# Patient Record
Sex: MALE | Race: WHITE | Employment: FULL TIME | URBAN - METROPOLITAN AREA
[De-identification: names, ages, dates, MRNs, and addresses within clinical notes are randomized per-mention and may not be internally consistent; named-entity substitution may affect disease eponyms.]

---

## 2018-04-24 ENCOUNTER — OFFICE VISIT (OUTPATIENT)
Dept: PULMONOLOGY | Age: 43
End: 2018-04-24
Payer: COMMERCIAL

## 2018-04-24 VITALS
WEIGHT: 295.6 LBS | DIASTOLIC BLOOD PRESSURE: 70 MMHG | OXYGEN SATURATION: 97 % | SYSTOLIC BLOOD PRESSURE: 110 MMHG | TEMPERATURE: 97.6 F | HEART RATE: 53 BPM | HEIGHT: 75 IN | BODY MASS INDEX: 36.75 KG/M2

## 2018-04-24 DIAGNOSIS — E66.9 CLASS 2 OBESITY WITHOUT SERIOUS COMORBIDITY IN ADULT, UNSPECIFIED BMI, UNSPECIFIED OBESITY TYPE: ICD-10-CM

## 2018-04-24 DIAGNOSIS — Z99.89 OSA ON CPAP: Primary | ICD-10-CM

## 2018-04-24 DIAGNOSIS — G47.33 OSA ON CPAP: Primary | ICD-10-CM

## 2018-04-24 PROCEDURE — G8427 DOCREV CUR MEDS BY ELIG CLIN: HCPCS | Performed by: PHYSICIAN ASSISTANT

## 2018-04-24 PROCEDURE — G8417 CALC BMI ABV UP PARAM F/U: HCPCS | Performed by: PHYSICIAN ASSISTANT

## 2018-04-24 PROCEDURE — 99213 OFFICE O/P EST LOW 20 MIN: CPT | Performed by: PHYSICIAN ASSISTANT

## 2018-04-24 PROCEDURE — 1036F TOBACCO NON-USER: CPT | Performed by: PHYSICIAN ASSISTANT

## 2018-04-24 ASSESSMENT — ENCOUNTER SYMPTOMS
SINUS PAIN: 0
ABDOMINAL PAIN: 0
SORE THROAT: 0
BACK PAIN: 0
STRIDOR: 0
RHINORRHEA: 0
SHORTNESS OF BREATH: 0
SINUS PRESSURE: 0
CHEST TIGHTNESS: 0
VOICE CHANGE: 0
TROUBLE SWALLOWING: 0
WHEEZING: 0
COLOR CHANGE: 0
COUGH: 0

## 2018-04-27 ENCOUNTER — TELEPHONE (OUTPATIENT)
Dept: PULMONOLOGY | Age: 43
End: 2018-04-27

## 2018-05-14 ENCOUNTER — TELEPHONE (OUTPATIENT)
Dept: PULMONOLOGY | Age: 43
End: 2018-05-14

## 2018-10-23 ENCOUNTER — OFFICE VISIT (OUTPATIENT)
Dept: PULMONOLOGY | Age: 43
End: 2018-10-23
Payer: COMMERCIAL

## 2018-10-23 VITALS
HEIGHT: 75 IN | TEMPERATURE: 98.4 F | HEART RATE: 80 BPM | BODY MASS INDEX: 34.82 KG/M2 | SYSTOLIC BLOOD PRESSURE: 124 MMHG | WEIGHT: 280 LBS | DIASTOLIC BLOOD PRESSURE: 76 MMHG | OXYGEN SATURATION: 97 % | RESPIRATION RATE: 16 BRPM

## 2018-10-23 DIAGNOSIS — G47.33 OSA ON CPAP: Primary | ICD-10-CM

## 2018-10-23 DIAGNOSIS — E66.9 CLASS 2 OBESITY WITHOUT SERIOUS COMORBIDITY IN ADULT, UNSPECIFIED BMI, UNSPECIFIED OBESITY TYPE: ICD-10-CM

## 2018-10-23 DIAGNOSIS — Z99.89 OSA ON CPAP: Primary | ICD-10-CM

## 2018-10-23 PROBLEM — G89.29 CHRONIC BILATERAL LOW BACK PAIN WITHOUT SCIATICA: Status: ACTIVE | Noted: 2018-07-11

## 2018-10-23 PROBLEM — M54.50 CHRONIC BILATERAL LOW BACK PAIN WITHOUT SCIATICA: Status: ACTIVE | Noted: 2018-07-11

## 2018-10-23 PROBLEM — H04.123 CHRONICALLY DRY EYES, BILATERAL: Status: ACTIVE | Noted: 2018-07-11

## 2018-10-23 PROBLEM — E55.9 VITAMIN D DEFICIENCY: Status: ACTIVE | Noted: 2018-07-12

## 2018-10-23 PROBLEM — M25.521 BILATERAL ELBOW JOINT PAIN: Status: ACTIVE | Noted: 2018-07-11

## 2018-10-23 PROBLEM — M79.641 BILATERAL HAND PAIN: Status: ACTIVE | Noted: 2018-07-11

## 2018-10-23 PROBLEM — M79.671 PAIN IN BOTH FEET: Status: ACTIVE | Noted: 2018-07-11

## 2018-10-23 PROBLEM — M25.60 JOINT STIFFNESS OF MULTIPLE SITES: Status: ACTIVE | Noted: 2018-07-11

## 2018-10-23 PROBLEM — M25.522 BILATERAL ELBOW JOINT PAIN: Status: ACTIVE | Noted: 2018-07-11

## 2018-10-23 PROBLEM — M79.672 PAIN IN BOTH FEET: Status: ACTIVE | Noted: 2018-07-11

## 2018-10-23 PROBLEM — M79.642 BILATERAL HAND PAIN: Status: ACTIVE | Noted: 2018-07-11

## 2018-10-23 PROCEDURE — 99213 OFFICE O/P EST LOW 20 MIN: CPT | Performed by: PHYSICIAN ASSISTANT

## 2018-10-23 PROCEDURE — 1036F TOBACCO NON-USER: CPT | Performed by: PHYSICIAN ASSISTANT

## 2018-10-23 PROCEDURE — G8417 CALC BMI ABV UP PARAM F/U: HCPCS | Performed by: PHYSICIAN ASSISTANT

## 2018-10-23 PROCEDURE — G8484 FLU IMMUNIZE NO ADMIN: HCPCS | Performed by: PHYSICIAN ASSISTANT

## 2018-10-23 PROCEDURE — G8427 DOCREV CUR MEDS BY ELIG CLIN: HCPCS | Performed by: PHYSICIAN ASSISTANT

## 2018-10-23 RX ORDER — ALLOPURINOL 300 MG/1
300 TABLET ORAL
COMMUNITY
Start: 2018-10-16

## 2018-10-23 RX ORDER — ERGOCALCIFEROL 1.25 MG/1
CAPSULE ORAL
COMMUNITY
Start: 2018-10-17 | End: 2019-03-11 | Stop reason: ALTCHOICE

## 2018-10-23 NOTE — PROGRESS NOTES
Medication Sig Dispense Refill    allopurinol (ZYLOPRIM) 300 MG tablet Take 300 mg by mouth      vitamin D (ERGOCALCIFEROL) 47100 units CAPS capsule       CPAP Machine MISC by Does not apply route Please supply patient with new CPAP machine patient is on auto CPAP with a minimum of 7 and a maximum of 20 cm nightly 1 each 0    CPAP Machine MISC by Does not apply route      testosterone (ANDROGEL) 25 MG/2.5GM (1%) GEL 1 % gel Apply 5 g topically daily .  colchicine (COLCRYS) 0.6 MG tablet Take 0.6 mg by mouth daily       No current facility-administered medications for this visit. Review of Systems   Constitutional: Negative for activity change, appetite change, fever and unexpected weight change. HENT: Negative for postnasal drip, rhinorrhea, sinus pain, sinus pressure, sneezing, sore throat, tinnitus, trouble swallowing and voice change. Eyes: Negative for visual disturbance. Respiratory: Negative for cough, chest tightness, shortness of breath, wheezing and stridor. Cardiovascular: Negative for chest pain and leg swelling. Gastrointestinal: Negative for abdominal pain. Musculoskeletal: Negative for arthralgias, back pain and myalgias. Skin: Negative for color change, rash and wound. Allergic/Immunologic: Negative for immunocompromised state. Neurological: Negative for dizziness and headaches. Hematological: Negative for adenopathy. Psychiatric/Behavioral: Negative for confusion. Objective    Vitals:    10/23/18 1534   BP: 124/76   Pulse: 80   Resp: 16   Temp: 98.4 °F (36.9 °C)   TempSrc: Tympanic   SpO2: 97%   Weight: 280 lb (127 kg)   Height: 6' 3\" (1.905 m)       Physical Exam   Constitutional: He is oriented to person, place, and time. He appears well-developed and well-nourished. No distress. HENT:   Head: Normocephalic and atraumatic. Right Ear: External ear normal. No tenderness. No middle ear effusion. Left Ear: External ear normal. No tenderness.   No He avoid supine sleep, sleep on her sides. Avoid  sleep deprivation.  Explained sleep hygiene.  Advice to avoid Alcohol and sedative    Reviewed with thepatient: current clinical status, medications, activities and diet. Side effects, adverse effects of the medication prescribed today, as well as treatment plan and result expectations have been discussed with thepatient who expresses understanding and desires to proceed. Close follow up to evaluate treatment results and for coordination of care. I have reviewed the patient's medical history in detail and updated thecomputerized patient record. Return in about 3 months (around 1/23/2019) for re-evaluation follow up with santos .     Stephanie Godfrey PA-C

## 2018-10-24 ASSESSMENT — ENCOUNTER SYMPTOMS
SINUS PAIN: 0
SORE THROAT: 0
RHINORRHEA: 0
TROUBLE SWALLOWING: 0
WHEEZING: 0
ABDOMINAL PAIN: 0
SINUS PRESSURE: 0
CHEST TIGHTNESS: 0
STRIDOR: 0
COUGH: 0
COLOR CHANGE: 0
SHORTNESS OF BREATH: 0
BACK PAIN: 0
VOICE CHANGE: 0

## 2019-03-11 ENCOUNTER — OFFICE VISIT (OUTPATIENT)
Dept: PULMONOLOGY | Age: 44
End: 2019-03-11
Payer: COMMERCIAL

## 2019-03-11 VITALS
DIASTOLIC BLOOD PRESSURE: 74 MMHG | SYSTOLIC BLOOD PRESSURE: 126 MMHG | HEART RATE: 78 BPM | OXYGEN SATURATION: 97 % | TEMPERATURE: 98.4 F | HEIGHT: 75 IN | WEIGHT: 288 LBS | BODY MASS INDEX: 35.81 KG/M2 | RESPIRATION RATE: 16 BRPM

## 2019-03-11 DIAGNOSIS — E66.9 OBESITY (BMI 30-39.9): ICD-10-CM

## 2019-03-11 DIAGNOSIS — G47.33 OSA (OBSTRUCTIVE SLEEP APNEA): Primary | ICD-10-CM

## 2019-03-11 DIAGNOSIS — E66.01 MORBID OBESITY (HCC): ICD-10-CM

## 2019-03-11 PROCEDURE — 99214 OFFICE O/P EST MOD 30 MIN: CPT | Performed by: INTERNAL MEDICINE

## 2019-03-11 PROCEDURE — G8427 DOCREV CUR MEDS BY ELIG CLIN: HCPCS | Performed by: INTERNAL MEDICINE

## 2019-03-11 PROCEDURE — G8417 CALC BMI ABV UP PARAM F/U: HCPCS | Performed by: INTERNAL MEDICINE

## 2019-03-11 PROCEDURE — G8484 FLU IMMUNIZE NO ADMIN: HCPCS | Performed by: INTERNAL MEDICINE

## 2019-03-11 PROCEDURE — 1036F TOBACCO NON-USER: CPT | Performed by: INTERNAL MEDICINE

## 2019-03-11 ASSESSMENT — ENCOUNTER SYMPTOMS
NAUSEA: 0
COUGH: 0
EYE ITCHING: 0
CHEST TIGHTNESS: 0
ABDOMINAL PAIN: 0
SORE THROAT: 0
VOMITING: 0
RHINORRHEA: 0
SHORTNESS OF BREATH: 0
VOICE CHANGE: 0
DIARRHEA: 0
WHEEZING: 0

## 2019-09-09 ENCOUNTER — OFFICE VISIT (OUTPATIENT)
Dept: PULMONOLOGY | Age: 44
End: 2019-09-09
Payer: COMMERCIAL

## 2019-09-09 VITALS
HEIGHT: 75 IN | SYSTOLIC BLOOD PRESSURE: 124 MMHG | HEART RATE: 81 BPM | BODY MASS INDEX: 36.56 KG/M2 | DIASTOLIC BLOOD PRESSURE: 62 MMHG | WEIGHT: 294 LBS | TEMPERATURE: 98.6 F | RESPIRATION RATE: 16 BRPM | OXYGEN SATURATION: 98 %

## 2019-09-09 DIAGNOSIS — G47.33 OSA (OBSTRUCTIVE SLEEP APNEA): Primary | ICD-10-CM

## 2019-09-09 DIAGNOSIS — E66.9 OBESITY (BMI 30-39.9): ICD-10-CM

## 2019-09-09 PROCEDURE — 99214 OFFICE O/P EST MOD 30 MIN: CPT | Performed by: INTERNAL MEDICINE

## 2019-09-09 PROCEDURE — G8427 DOCREV CUR MEDS BY ELIG CLIN: HCPCS | Performed by: INTERNAL MEDICINE

## 2019-09-09 PROCEDURE — G8417 CALC BMI ABV UP PARAM F/U: HCPCS | Performed by: INTERNAL MEDICINE

## 2019-09-09 PROCEDURE — 1036F TOBACCO NON-USER: CPT | Performed by: INTERNAL MEDICINE

## 2019-09-09 RX ORDER — SILDENAFIL 50 MG/1
TABLET, FILM COATED ORAL
COMMUNITY
Start: 2019-08-26

## 2019-09-09 RX ORDER — KETOCONAZOLE 20 MG/ML
SHAMPOO TOPICAL
COMMUNITY
Start: 2019-08-06

## 2019-09-09 RX ORDER — TESTOSTERONE 16.2 MG/G
GEL TRANSDERMAL
COMMUNITY
Start: 2019-08-15

## 2019-09-09 RX ORDER — FLUOCINONIDE TOPICAL SOLUTION USP, 0.05% 0.5 MG/ML
SOLUTION TOPICAL
COMMUNITY
Start: 2019-08-06

## 2019-09-09 ASSESSMENT — ENCOUNTER SYMPTOMS
SHORTNESS OF BREATH: 0
RHINORRHEA: 0
SORE THROAT: 0
VOMITING: 0
DIARRHEA: 0
COUGH: 0
WHEEZING: 0
CHEST TIGHTNESS: 0
VOICE CHANGE: 0
ABDOMINAL PAIN: 0
NAUSEA: 0
EYE ITCHING: 0

## 2019-09-09 NOTE — PROGRESS NOTES
to light. Conjunctivae and EOM are normal.   Neck: No JVD present. No tracheal deviation present. No thyromegaly present. Cardiovascular: Normal rate and regular rhythm. Exam reveals no gallop and no friction rub. No murmur heard. Pulmonary/Chest: Effort normal and breath sounds normal. No respiratory distress. He has no wheezes. He has no rales. He exhibits no tenderness. Abdominal: He exhibits no distension. Musculoskeletal: Normal range of motion. Lymphadenopathy:     He has no cervical adenopathy. Neurological: He is alert and oriented to person, place, and time. No cranial nerve deficit. Skin: Skin is warm and dry. No rash noted. Psychiatric: He has a normal mood and affect. His behavior is normal.       Current Outpatient Medications   Medication Sig Dispense Refill    Testosterone (ANDROGEL) 20.25 MG/ACT (1.62%) GEL gel       ketoconazole (NIZORAL) 2 % shampoo       fluocinonide (LIDEX) 0.05 % external solution       sildenafil (VIAGRA) 50 MG tablet       Flaxseed Oil OIL Take by mouth      allopurinol (ZYLOPRIM) 300 MG tablet Take 300 mg by mouth      CPAP Machine MISC by Does not apply route Please supply patient with new CPAP machine patient is on auto CPAP with a minimum of 7 and a maximum of 20 cm nightly 1 each 0    CPAP Machine MISC by Does not apply route      colchicine (COLCRYS) 0.6 MG tablet Take 0.6 mg by mouth daily       No current facility-administered medications for this visit. Assessment/Plan:     1. PARVIZ (obstructive sleep apnea)  Patient is using Auto CPAP with  7-20   centimeters of H2O with heated humidity. He  is using CPAP for about   6-7 hours every night. He  is using CPAP with arlen view mask   Mask. He said  sleep is restful with the CPAP use. He  is compliant with CPAP therapy and benefiting with CPAP use. No snoring with CPAP use. Continue CPAP as before. Counseling: CPAP/BiPAP uses, patient advised to use CPAP at least 5-6 hours every night.

## 2020-03-09 ENCOUNTER — OFFICE VISIT (OUTPATIENT)
Dept: PULMONOLOGY | Age: 45
End: 2020-03-09
Payer: COMMERCIAL

## 2020-03-09 VITALS
HEART RATE: 78 BPM | DIASTOLIC BLOOD PRESSURE: 68 MMHG | RESPIRATION RATE: 15 BRPM | WEIGHT: 299.6 LBS | OXYGEN SATURATION: 98 % | HEIGHT: 75 IN | SYSTOLIC BLOOD PRESSURE: 108 MMHG | BODY MASS INDEX: 37.25 KG/M2 | TEMPERATURE: 96.6 F

## 2020-03-09 PROCEDURE — G8417 CALC BMI ABV UP PARAM F/U: HCPCS | Performed by: INTERNAL MEDICINE

## 2020-03-09 PROCEDURE — 99214 OFFICE O/P EST MOD 30 MIN: CPT | Performed by: INTERNAL MEDICINE

## 2020-03-09 PROCEDURE — G8427 DOCREV CUR MEDS BY ELIG CLIN: HCPCS | Performed by: INTERNAL MEDICINE

## 2020-03-09 PROCEDURE — 1036F TOBACCO NON-USER: CPT | Performed by: INTERNAL MEDICINE

## 2020-03-09 PROCEDURE — G8484 FLU IMMUNIZE NO ADMIN: HCPCS | Performed by: INTERNAL MEDICINE

## 2020-03-09 ASSESSMENT — ENCOUNTER SYMPTOMS
RHINORRHEA: 0
WHEEZING: 0
EYE ITCHING: 0
ABDOMINAL PAIN: 0
SORE THROAT: 0
COUGH: 0
CHEST TIGHTNESS: 0
VOICE CHANGE: 0
DIARRHEA: 0
VOMITING: 0
SHORTNESS OF BREATH: 0
NAUSEA: 0

## 2020-03-09 NOTE — PROGRESS NOTES
the CPAP use. He is compliant with CPAP therapy and benefiting with CPAP use. No snoring with CPAP use. coninue CPAP as before     Counseling: CPAP/BiPAP uses, patient advised to use CPAP at least 5-6 hours every night. Driving: patient is advised for extreme caution when driving or operating machinery if there is a feeling of drowsiness, especially while driving it is preferable to stop driving and take a brief nap. Sleep hygiene:Avoid supine sleep, sleep on  sides. Avoid  sleep deprivation. Explained sleep hygiene. Advice to avoid Alcohol and sedative    Time spend over 25 min. Face to face. with greater than 50 % time with counseling regarding CPAP therapy. 2. Obesity (BMI 30-39. 9)  Patient patient is advised try to lose weight. obesity related risk explained to the patient ,  Current weight:  299 lb 9.6 oz (135.9 kg) Lbs. BMI:  Body mass index is 37.45 kg/m². Suggested weight control approaches, including dietary changes , exercise, behavioral modification. Return in about 11 months (around 2/9/2021) for carlos.       Vivi Verma MD

## 2021-02-08 ENCOUNTER — OFFICE VISIT (OUTPATIENT)
Dept: PULMONOLOGY | Age: 46
End: 2021-02-08
Payer: COMMERCIAL

## 2021-02-08 VITALS
OXYGEN SATURATION: 97 % | TEMPERATURE: 97.5 F | HEART RATE: 75 BPM | WEIGHT: 286 LBS | DIASTOLIC BLOOD PRESSURE: 70 MMHG | SYSTOLIC BLOOD PRESSURE: 122 MMHG | HEIGHT: 75 IN | RESPIRATION RATE: 16 BRPM | BODY MASS INDEX: 35.56 KG/M2

## 2021-02-08 DIAGNOSIS — G47.33 OSA (OBSTRUCTIVE SLEEP APNEA): Primary | ICD-10-CM

## 2021-02-08 DIAGNOSIS — E66.9 OBESITY (BMI 30-39.9): ICD-10-CM

## 2021-02-08 PROCEDURE — G8417 CALC BMI ABV UP PARAM F/U: HCPCS | Performed by: INTERNAL MEDICINE

## 2021-02-08 PROCEDURE — 99214 OFFICE O/P EST MOD 30 MIN: CPT | Performed by: INTERNAL MEDICINE

## 2021-02-08 PROCEDURE — 1036F TOBACCO NON-USER: CPT | Performed by: INTERNAL MEDICINE

## 2021-02-08 PROCEDURE — G8484 FLU IMMUNIZE NO ADMIN: HCPCS | Performed by: INTERNAL MEDICINE

## 2021-02-08 PROCEDURE — G8427 DOCREV CUR MEDS BY ELIG CLIN: HCPCS | Performed by: INTERNAL MEDICINE

## 2021-02-08 RX ORDER — LIFITEGRAST 50 MG/ML
SOLUTION/ DROPS OPHTHALMIC
COMMUNITY
Start: 2021-01-17

## 2021-02-08 ASSESSMENT — ENCOUNTER SYMPTOMS
NAUSEA: 0
VOMITING: 0
COUGH: 0
VOICE CHANGE: 0
ABDOMINAL PAIN: 0
CHEST TIGHTNESS: 0
SORE THROAT: 0
DIARRHEA: 0
EYE ITCHING: 0
RHINORRHEA: 0
WHEEZING: 0
SHORTNESS OF BREATH: 0

## 2021-02-08 NOTE — PROGRESS NOTES
Subjective:     Song Wilcox is a 39 y.o. male who complains today of:     Chief Complaint   Patient presents with    Follow-up     11 month f/u for PARVIZ. HPI  He is using Auto CPAP with 7-20  centimeters of H2O with heated humidity. He is using CPAP for about  6-7 hours every night. He is using CPAP with   Danika view Mask. He said  sleep is restful with the CPAP use. He is compliant with CPAP therapy and benefiting with CPAP use. No snoring with CPAP use. No complaint of daytime sleepiness or tiredness with CPAP use. He denies taking naps. No sleepiness with driving. He denies difficulty falling asleep or staying asleep.     Allergies:  Amoxicillin, Cefaclor, Penicillins, Sulfa antibiotics, and Tetracycline  Past Medical History:   Diagnosis Date    Chronic headaches     PARVIZ (obstructive sleep apnea)      Past Surgical History:   Procedure Laterality Date    NOSE SURGERY  1998    polyps removed    RHINOPLASTY  1992     Family History   Problem Relation Age of Onset    Cancer Mother     Breast Cancer Mother     Hypertension Father      Social History     Socioeconomic History    Marital status:      Spouse name: Not on file    Number of children: Not on file    Years of education: Not on file    Highest education level: Not on file   Occupational History    Not on file   Social Needs    Financial resource strain: Not on file    Food insecurity     Worry: Not on file     Inability: Not on file    Transportation needs     Medical: Not on file     Non-medical: Not on file   Tobacco Use    Smoking status: Never Smoker    Smokeless tobacco: Never Used   Substance and Sexual Activity    Alcohol use: Yes     Comment: occasionally    Drug use: No    Sexual activity: Not on file   Lifestyle    Physical activity     Days per week: Not on file     Minutes per session: Not on file    Stress: Not on file   Relationships    Social connections     Talks on phone: Not on file Gets together: Not on file     Attends Zoroastrianism service: Not on file     Active member of club or organization: Not on file     Attends meetings of clubs or organizations: Not on file     Relationship status: Not on file    Intimate partner violence     Fear of current or ex partner: Not on file     Emotionally abused: Not on file     Physically abused: Not on file     Forced sexual activity: Not on file   Other Topics Concern    Not on file   Social History Narrative    Not on file         Review of Systems   Constitutional: Negative for chills, diaphoresis, fatigue and fever. HENT: Negative for congestion, mouth sores, nosebleeds, postnasal drip, rhinorrhea, sneezing, sore throat and voice change. Eyes: Negative for itching and visual disturbance. Respiratory: Negative for cough, chest tightness, shortness of breath and wheezing. Cardiovascular: Negative. Negative for chest pain, palpitations and leg swelling. Gastrointestinal: Negative for abdominal pain, diarrhea, nausea and vomiting. Genitourinary: Negative for difficulty urinating and hematuria. Musculoskeletal: Negative for arthralgias, joint swelling and myalgias. Skin: Negative for rash. Allergic/Immunologic: Negative for environmental allergies. Neurological: Negative for dizziness, tremors, weakness and headaches. Psychiatric/Behavioral: Positive for sleep disturbance. Negative for behavioral problems. :     Vitals:    02/08/21 0838   BP: 122/70   Pulse: 75   Resp: 16   Temp: 97.5 °F (36.4 °C)   TempSrc: Temporal   SpO2: 97%   Weight: 286 lb (129.7 kg)   Height: 6' 3\" (1.905 m)     Wt Readings from Last 3 Encounters:   02/08/21 286 lb (129.7 kg)   03/09/20 299 lb 9.6 oz (135.9 kg)   09/09/19 294 lb (133.4 kg)         Physical Exam  Constitutional:       Appearance: He is well-developed. He is obese. HENT:      Head: Normocephalic and atraumatic.       Nose: Nose normal.   Eyes: Conjunctiva/sclera: Conjunctivae normal.      Pupils: Pupils are equal, round, and reactive to light. Neck:      Thyroid: No thyromegaly. Vascular: No JVD. Trachea: No tracheal deviation. Cardiovascular:      Rate and Rhythm: Normal rate and regular rhythm. Heart sounds: No murmur. No friction rub. No gallop. Pulmonary:      Effort: Pulmonary effort is normal. No respiratory distress. Breath sounds: Normal breath sounds. No wheezing or rales. Chest:      Chest wall: No tenderness. Abdominal:      General: There is no distension. Musculoskeletal: Normal range of motion. Lymphadenopathy:      Cervical: No cervical adenopathy. Skin:     General: Skin is warm and dry. Findings: No rash. Neurological:      Mental Status: He is alert and oriented to person, place, and time. Cranial Nerves: No cranial nerve deficit. Psychiatric:         Behavior: Behavior normal.         Current Outpatient Medications   Medication Sig Dispense Refill    XIIDRA 5 % SOLN       Respiratory Therapy Supplies BEVERLY New CPAP mask and supplies 1 Device 0    Testosterone (ANDROGEL) 20.25 MG/ACT (1.62%) GEL gel       ketoconazole (NIZORAL) 2 % shampoo       fluocinonide (LIDEX) 0.05 % external solution       sildenafil (VIAGRA) 50 MG tablet       Flaxseed Oil OIL Take by mouth      allopurinol (ZYLOPRIM) 300 MG tablet Take 300 mg by mouth      CPAP Machine MISC by Does not apply route Please supply patient with new CPAP machine patient is on auto CPAP with a minimum of 7 and a maximum of 20 cm nightly 1 each 0    CPAP Machine MISC by Does not apply route      colchicine (COLCRYS) 0.6 MG tablet Take 0.6 mg by mouth as needed        No current facility-administered medications for this visit.           Assessment/Plan:     1. PARVIZ (obstructive sleep apnea) He is using Auto CPAP with 7-20  centimeters of H2O with heated humidity. He is using CPAP for about  6-7 hours every night. He is using CPAP with   Danika view Mask. He said  sleep is restful with the CPAP use. He is compliant with CPAP therapy and benefiting with CPAP use. No snoring with CPAP use. Continue CPAP therapy as before. No need for CPAP supply    - Respiratory Therapy Supplies BEVERLY; New CPAP mask and supplies  Dispense: 1 Device; Refill: 0    Counseling: CPAP/BiPAP uses, He advised to use CPAP at least 5-6 hours every night. Driving: He is advised for extreme caution when driving or operating machinery if there is a feeling of drowsiness, especially while driving it is preferable to stop driving and take a brief nap. Sleep hygiene:Avoid supine sleep, sleep on  sides. Avoid  sleep deprivation. Explained sleep hygiene. Advice to avoid Alcohol and sedative    Time spend over 30 min. Face to face. with greater than 50 % time with counseling regarding CPAP therapy. 2. Obesity (BMI 30-39. 9)  He is advised try to lose weight. obesity related risk explained to the patient ,  Current weight:  286 lb (129.7 kg) Lbs. BMI:  Body mass index is 35.75 kg/m². Suggested weight control approaches, including dietary changes , exercise, behavioral modification. Return in about 1 year (around 2/8/2022) for carlos.       Petros Garcia MD

## 2022-02-09 ENCOUNTER — OFFICE VISIT (OUTPATIENT)
Dept: PULMONOLOGY | Age: 47
End: 2022-02-09
Payer: COMMERCIAL

## 2022-02-09 VITALS
SYSTOLIC BLOOD PRESSURE: 120 MMHG | WEIGHT: 224 LBS | BODY MASS INDEX: 27.85 KG/M2 | HEIGHT: 75 IN | OXYGEN SATURATION: 99 % | HEART RATE: 72 BPM | DIASTOLIC BLOOD PRESSURE: 56 MMHG | TEMPERATURE: 98.2 F

## 2022-02-09 DIAGNOSIS — G47.33 OSA (OBSTRUCTIVE SLEEP APNEA): Primary | ICD-10-CM

## 2022-02-09 PROCEDURE — G8484 FLU IMMUNIZE NO ADMIN: HCPCS | Performed by: INTERNAL MEDICINE

## 2022-02-09 PROCEDURE — 99214 OFFICE O/P EST MOD 30 MIN: CPT | Performed by: INTERNAL MEDICINE

## 2022-02-09 PROCEDURE — G8419 CALC BMI OUT NRM PARAM NOF/U: HCPCS | Performed by: INTERNAL MEDICINE

## 2022-02-09 PROCEDURE — 1036F TOBACCO NON-USER: CPT | Performed by: INTERNAL MEDICINE

## 2022-02-09 PROCEDURE — G8427 DOCREV CUR MEDS BY ELIG CLIN: HCPCS | Performed by: INTERNAL MEDICINE

## 2022-02-09 ASSESSMENT — ENCOUNTER SYMPTOMS
COUGH: 0
SORE THROAT: 0
RHINORRHEA: 0
WHEEZING: 0
ABDOMINAL PAIN: 0
NAUSEA: 0
VOICE CHANGE: 0
EYE ITCHING: 0
DIARRHEA: 0
CHEST TIGHTNESS: 0
SHORTNESS OF BREATH: 0
VOMITING: 0

## 2022-02-09 NOTE — PROGRESS NOTES
Subjective:     Lowell Dominguez is a 42 y.o. male who complains today of:     Chief Complaint   Patient presents with    Sleep Apnea     1 year f/u       HPI  He is using Auto CPAP with 7-20  centimeters of H2O with heated humidity. He is using CPAP for about  5 hours every night. He is feeling bloated in morning. He is using CPAP with   Danika view Mask. He  Lost 75  Lbs in last 2 years. He want to see if he still has sleep apnea want to have sleep study done . He would like  To have home sleep study. No snoring with CPAP use. No complaint of daytime sleepiness or tiredness with CPAP use. He denies taking naps. No sleepiness with driving. He denies difficulty falling asleep or staying asleep.     Allergies:  Amoxicillin, Cefaclor, Penicillins, Sulfa antibiotics, and Tetracycline  Past Medical History:   Diagnosis Date    Chronic headaches     PARVIZ (obstructive sleep apnea)      Past Surgical History:   Procedure Laterality Date    NOSE SURGERY  1998    polyps removed    RHINOPLASTY  1992     Family History   Problem Relation Age of Onset    Cancer Mother     Breast Cancer Mother     Hypertension Father      Social History     Socioeconomic History    Marital status:      Spouse name: Not on file    Number of children: Not on file    Years of education: Not on file    Highest education level: Not on file   Occupational History    Not on file   Tobacco Use    Smoking status: Never Smoker    Smokeless tobacco: Never Used   Substance and Sexual Activity    Alcohol use: Yes     Comment: occasionally    Drug use: No    Sexual activity: Not on file   Other Topics Concern    Not on file   Social History Narrative    Not on file     Social Determinants of Health     Financial Resource Strain:     Difficulty of Paying Living Expenses: Not on file   Food Insecurity:     Worried About Running Out of Food in the Last Year: Not on file    [place] of Food in the Last Year: Not on file Transportation Needs:     Lack of Transportation (Medical): Not on file    Lack of Transportation (Non-Medical): Not on file   Physical Activity:     Days of Exercise per Week: Not on file    Minutes of Exercise per Session: Not on file   Stress:     Feeling of Stress : Not on file   Social Connections:     Frequency of Communication with Friends and Family: Not on file    Frequency of Social Gatherings with Friends and Family: Not on file    Attends [employer] Services: Not on file    Active Member of [address] [place] [address] or Organizations: Not on file    Attends Club or Organization Meetings: Not on file    Marital Status: Not on file   Intimate Partner Violence:     Fear of Current or Ex-Partner: Not on file    Emotionally Abused: Not on file    Physically Abused: Not on file    Sexually Abused: Not on file   Housing Stability:     Unable to Pay for Housing in the Last Year: Not on file    Number of [place] in the Last Year: Not on file    Unstable Housing in the Last Year: Not on file         Review of Systems   Constitutional: Negative for chills, diaphoresis, fatigue and fever. HENT: Negative for congestion, mouth sores, nosebleeds, postnasal drip, rhinorrhea, sneezing, sore throat and voice change. Eyes: Negative for itching and visual disturbance. Respiratory: Negative for cough, chest tightness, shortness of breath and wheezing. Cardiovascular: Negative. Negative for chest pain, palpitations and leg swelling. Gastrointestinal: Negative for abdominal pain, diarrhea, nausea and vomiting. Genitourinary: Negative for difficulty urinating and hematuria. Musculoskeletal: Negative for arthralgias, joint swelling and myalgias. Skin: Negative for rash. Allergic/Immunologic: Negative for environmental allergies. Neurological: Negative for dizziness, tremors, weakness and headaches. Psychiatric/Behavioral: Positive for sleep disturbance. Negative for behavioral problems.          Marlyn Canales Vitals:    02/09/22 0856   BP: (!) 120/56   Pulse: 72   Temp: 98.2 °F (36.8 °C)   SpO2: 99%   Weight: 224 lb (101.6 kg)   Height: 6' 3\" (1.905 m)     Wt Readings from Last 3 Encounters:   02/09/22 224 lb (101.6 kg)   02/08/21 286 lb (129.7 kg)   03/09/20 299 lb 9.6 oz (135.9 kg)         Physical Exam  Constitutional:       Appearance: He is well-developed. HENT:      Head: Normocephalic and atraumatic. Nose: Nose normal.   Eyes:      Conjunctiva/sclera: Conjunctivae normal.      Pupils: Pupils are equal, round, and reactive to light. Neck:      Thyroid: No thyromegaly. Vascular: No JVD. Trachea: No tracheal deviation. Cardiovascular:      Rate and Rhythm: Normal rate and regular rhythm. Heart sounds: No murmur heard. No friction rub. No gallop. Pulmonary:      Effort: Pulmonary effort is normal. No respiratory distress. Breath sounds: Normal breath sounds. No wheezing or rales. Chest:      Chest wall: No tenderness. Abdominal:      General: There is no distension. Musculoskeletal:         General: Normal range of motion. Lymphadenopathy:      Cervical: No cervical adenopathy. Skin:     General: Skin is warm and dry. Findings: No rash. Neurological:      Mental Status: He is alert and oriented to person, place, and time. Cranial Nerves: No cranial nerve deficit.    Psychiatric:         Behavior: Behavior normal.         Current Outpatient Medications   Medication Sig Dispense Refill    XIIDRA 5 % SOLN       Respiratory Therapy Supplies BEVERLY New CPAP mask and supplies 1 Device 0    Testosterone (ANDROGEL) 20.25 MG/ACT (1.62%) GEL gel       ketoconazole (NIZORAL) 2 % shampoo       fluocinonide (LIDEX) 0.05 % external solution       sildenafil (VIAGRA) 50 MG tablet       Flaxseed Oil OIL Take by mouth      allopurinol (ZYLOPRIM) 300 MG tablet Take 300 mg by mouth      CPAP Machine MISC by Does not apply route Please supply patient with new CPAP machine patient is on auto CPAP with a minimum of 7 and a maximum of 20 cm nightly 1 each 0    CPAP Machine MISC by Does not apply route      colchicine (COLCRYS) 0.6 MG tablet Take 0.6 mg by mouth as needed        No current facility-administered medications for this visit. Assessment/Plan:     1. PARVIZ (obstructive sleep apnea)  He is using Auto CPAP with 7-20  centimeters of H2O with heated humidity. He is using CPAP for about  5 hours every night. He is feeling bloated in morning. He is using CPAP with   Danika view Mask. He  Lost 75  Lbs in last 2 years. He want to see if he still has sleep apnea want to have sleep study done . He would like  To have home sleep study. Request made for HST   . - Home Sleep Study; Future    Counseling: CPAP/BiPAP uses, He advised to use CPAP at least 5-6 hours every night. Driving: He is advised for extreme caution when driving or operating machinery if there is a feeling of drowsiness, especially while driving it is preferable to stop driving and take a brief nap. Sleep hygiene:Avoid supine sleep, sleep on  sides. Avoid  sleep deprivation. Explained sleep hygiene. Advice to avoid Alcohol and sedative    Time spend about 30 min. Face to face. with greater than 50 % time with CPAP therapy including review compliance, counseling and advised regarding CPAP therapy. Return in about 6 weeks (around 3/23/2022).       Mar Waldrop MD

## 2022-03-08 ENCOUNTER — HOSPITAL ENCOUNTER (OUTPATIENT)
Dept: SLEEP CENTER | Age: 47
Discharge: HOME | End: 2022-03-10
Payer: COMMERCIAL

## 2022-03-08 PROCEDURE — 95806 SLEEP STUDY UNATT&RESP EFFT: CPT

## 2022-03-14 PROCEDURE — 95806 SLEEP STUDY UNATT&RESP EFFT: CPT | Performed by: INTERNAL MEDICINE

## 2022-03-15 DIAGNOSIS — G47.33 OSA (OBSTRUCTIVE SLEEP APNEA): ICD-10-CM

## 2022-03-23 ENCOUNTER — OFFICE VISIT (OUTPATIENT)
Dept: PULMONOLOGY | Age: 47
End: 2022-03-23
Payer: COMMERCIAL

## 2022-03-23 VITALS
BODY MASS INDEX: 28.35 KG/M2 | DIASTOLIC BLOOD PRESSURE: 57 MMHG | WEIGHT: 228 LBS | TEMPERATURE: 98.3 F | HEART RATE: 60 BPM | OXYGEN SATURATION: 99 % | HEIGHT: 75 IN | SYSTOLIC BLOOD PRESSURE: 115 MMHG

## 2022-03-23 DIAGNOSIS — G47.30 SLEEP APNEA, UNSPECIFIED TYPE: Primary | ICD-10-CM

## 2022-03-23 DIAGNOSIS — E66.3 OVERWEIGHT (BMI 25.0-29.9): ICD-10-CM

## 2022-03-23 PROCEDURE — G8484 FLU IMMUNIZE NO ADMIN: HCPCS | Performed by: INTERNAL MEDICINE

## 2022-03-23 PROCEDURE — G8427 DOCREV CUR MEDS BY ELIG CLIN: HCPCS | Performed by: INTERNAL MEDICINE

## 2022-03-23 PROCEDURE — 99214 OFFICE O/P EST MOD 30 MIN: CPT | Performed by: INTERNAL MEDICINE

## 2022-03-23 PROCEDURE — 1036F TOBACCO NON-USER: CPT | Performed by: INTERNAL MEDICINE

## 2022-03-23 PROCEDURE — G8419 CALC BMI OUT NRM PARAM NOF/U: HCPCS | Performed by: INTERNAL MEDICINE

## 2022-03-23 ASSESSMENT — ENCOUNTER SYMPTOMS
RHINORRHEA: 0
DIARRHEA: 0
WHEEZING: 0
CHEST TIGHTNESS: 0
EYE ITCHING: 0
NAUSEA: 0
SHORTNESS OF BREATH: 0
VOMITING: 0
VOICE CHANGE: 0
SORE THROAT: 0
ABDOMINAL PAIN: 0
COUGH: 0

## 2022-03-23 NOTE — PROGRESS NOTES
Subjective:     Wilber Candelaria is a 42 y.o. male who complains today of:     Chief Complaint   Patient presents with    Sleep Apnea     6 week f/u       HPI  He has HST done show no significant  PARVIZ , AHI 1.4 . No O2 desaturion or significant snoring   He is using Auto CPAP with 7-20 centimeters of H2O with heated humidity. he has gas in stomach and he had to take mask off due stomach cremps. No snoring with CPAP use. He has no c/o vivid dreams or night villalobos. No complaint of daytime sleepiness or tiredness with CPAP use. He denies taking naps. No sleepiness with driving. He denies difficulty falling asleep or staying asleep. Allergies:  Amoxicillin, Cefaclor, Penicillins, Sulfa antibiotics, and Tetracycline  Past Medical History:   Diagnosis Date    Chronic headaches     PARVIZ (obstructive sleep apnea)      Past Surgical History:   Procedure Laterality Date    NOSE SURGERY  1998    polyps removed    RHINOPLASTY  1992     Family History   Problem Relation Age of Onset    Cancer Mother     Breast Cancer Mother     Hypertension Father      Social History     Socioeconomic History    Marital status:      Spouse name: Not on file    Number of children: Not on file    Years of education: Not on file    Highest education level: Not on file   Occupational History    Not on file   Tobacco Use    Smoking status: Never Smoker    Smokeless tobacco: Never Used   Substance and Sexual Activity    Alcohol use: Yes     Comment: occasionally    Drug use: No    Sexual activity: Not on file   Other Topics Concern    Not on file   Social History Narrative    Not on file     Social Determinants of Health     Financial Resource Strain:     Difficulty of Paying Living Expenses: Not on file   Food Insecurity:     Worried About Running Out of Food in the Last Year: Not on file    [place]ell of Food in the Last Year: Not on file   Transportation Needs:     Lack of Transportation (Medical):  Not on file    Lack of Transportation (Non-Medical): Not on file   Physical Activity:     Days of Exercise per Week: Not on file    Minutes of Exercise per Session: Not on file   Stress:     Feeling of Stress : Not on file   Social Connections:     Frequency of Communication with Friends and Family: Not on file    Frequency of Social Gatherings with Friends and Family: Not on file    Attends Anabaptist Services: Not on file    Active Member of [address] [place] [address] or Organizations: Not on file    Attends Club or Organization Meetings: Not on file    Marital Status: Not on file   Intimate Partner Violence:     Fear of Current or Ex-Partner: Not on file    Emotionally Abused: Not on file    Physically Abused: Not on file    Sexually Abused: Not on file   Housing Stability:     Unable to Pay for Housing in the Last Year: Not on file    Number of [place] in the Last Year: Not on file    Unstable Housing in the Last Year: Not on file         Review of Systems   Constitutional: Negative for chills, diaphoresis, fatigue and fever. HENT: Negative for congestion, mouth sores, nosebleeds, postnasal drip, rhinorrhea, sneezing, sore throat and voice change. Eyes: Negative for itching and visual disturbance. Respiratory: Negative for cough, chest tightness, shortness of breath and wheezing. Cardiovascular: Negative. Negative for chest pain, palpitations and leg swelling. Gastrointestinal: Negative for abdominal pain, diarrhea, nausea and vomiting. Genitourinary: Negative for difficulty urinating and hematuria. Musculoskeletal: Negative for arthralgias, joint swelling and myalgias. Skin: Negative for rash. Allergic/Immunologic: Negative for environmental allergies. Neurological: Negative for dizziness, tremors, weakness and headaches.    Psychiatric/Behavioral: Negative for behavioral problems and sleep disturbance.         :     Vitals:    03/23/22 0915   BP: (!) 115/57   Pulse: 60   Temp: 98.3 °F (36.8 °C) SpO2: 99%   Weight: 228 lb (103.4 kg)   Height: 6' 3\" (1.905 m)     Wt Readings from Last 3 Encounters:   03/23/22 228 lb (103.4 kg)   02/09/22 224 lb (101.6 kg)   02/08/21 286 lb (129.7 kg)         Physical Exam  Constitutional:       Appearance: He is well-developed. HENT:      Head: Normocephalic and atraumatic. Nose: Nose normal.   Eyes:      Conjunctiva/sclera: Conjunctivae normal.      Pupils: Pupils are equal, round, and reactive to light. Neck:      Thyroid: No thyromegaly. Vascular: No JVD. Trachea: No tracheal deviation. Cardiovascular:      Rate and Rhythm: Normal rate and regular rhythm. Heart sounds: No murmur heard. No friction rub. No gallop. Pulmonary:      Effort: Pulmonary effort is normal. No respiratory distress. Breath sounds: Normal breath sounds. No wheezing or rales. Chest:      Chest wall: No tenderness. Abdominal:      General: There is no distension. Musculoskeletal:         General: Normal range of motion. Lymphadenopathy:      Cervical: No cervical adenopathy. Skin:     General: Skin is warm and dry. Findings: No rash. Neurological:      Mental Status: He is alert and oriented to person, place, and time. Cranial Nerves: No cranial nerve deficit.    Psychiatric:         Behavior: Behavior normal.         Current Outpatient Medications   Medication Sig Dispense Refill    XIIDRA 5 % SOLN       Respiratory Therapy Supplies [facility] New CPAP mask and supplies 1 Device 0    Testosterone (ANDROGEL) 20.25 MG/ACT (1.62%) GEL gel       ketoconazole (NIZORAL) 2 % shampoo       fluocinonide (LIDEX) 0.05 % external solution       sildenafil (VIAGRA) 50 MG tablet       Flaxseed Oil OIL Take by mouth      allopurinol (ZYLOPRIM) 300 MG tablet Take 300 mg by mouth      CPAP Machine MISC by Does not apply route Please supply patient with new CPAP machine patient is on auto CPAP with a minimum of 7 and a maximum of 20 cm nightly 1 each 0    CPAP Machine MISC by Does not apply route      colchicine (COLCRYS) 0.6 MG tablet Take 0.6 mg by mouth as needed        No current facility-administered medications for this visit. Assessment/Plan:     1. Sleep apnea, unspecified type  He has been having difficulty using CPAP therapy due tohe has gas in stomach and he had to take mask off due stomach cremps. He also had lost a lot of weight and he is not snoring much and is sleeping better. He had home sleep study done to see if he still have sleep apnea or not. He had HST done show no significant  PARVIZ , AHI 1.4 . No O2 desaturion or significant snoring. 2. Overweight (BMI 25.0-29. 9)  He is advised try to lose weight. obesity related risk explained to the patient ,  Current weight:  228 lb (103.4 kg) Lbs. BMI:  Body mass index is 28.5 kg/m². Suggested weight control approaches, including dietary changes , exercise, behavioral modification. Return in about 6 months (around 9/23/2022) for parviz.       Agapito Flores MD

## 2023-04-17 ENCOUNTER — TELEPHONE (OUTPATIENT)
Dept: PRIMARY CARE | Facility: CLINIC | Age: 48
End: 2023-04-17
Payer: COMMERCIAL

## 2023-04-17 NOTE — TELEPHONE ENCOUNTER
Called patient states he got in with Occupational Health today. If that don't work he will call back.

## 2023-04-24 ENCOUNTER — TELEPHONE (OUTPATIENT)
Dept: PRIMARY CARE | Facility: CLINIC | Age: 48
End: 2023-04-24
Payer: COMMERCIAL

## 2023-04-24 NOTE — TELEPHONE ENCOUNTER
Pt called, tested positive for covid last week and is still having a lot of problems, are we able to get him him this week or waiting till next week okay?

## 2023-04-25 ENCOUNTER — OFFICE VISIT (OUTPATIENT)
Dept: PRIMARY CARE | Facility: CLINIC | Age: 48
End: 2023-04-25
Payer: COMMERCIAL

## 2023-04-25 VITALS
HEIGHT: 75 IN | WEIGHT: 250 LBS | HEART RATE: 76 BPM | RESPIRATION RATE: 16 BRPM | OXYGEN SATURATION: 97 % | SYSTOLIC BLOOD PRESSURE: 128 MMHG | BODY MASS INDEX: 31.08 KG/M2 | TEMPERATURE: 98.2 F | DIASTOLIC BLOOD PRESSURE: 82 MMHG

## 2023-04-25 DIAGNOSIS — G44.89 OTHER HEADACHE SYNDROME: ICD-10-CM

## 2023-04-25 DIAGNOSIS — U07.1 COVID-19: Primary | ICD-10-CM

## 2023-04-25 DIAGNOSIS — G93.31 POSTVIRAL FATIGUE SYNDROME: ICD-10-CM

## 2023-04-25 PROBLEM — M1A.09X0 IDIOPATHIC CHRONIC GOUT OF MULTIPLE SITES WITHOUT TOPHUS: Status: ACTIVE | Noted: 2019-02-28

## 2023-04-25 PROBLEM — M25.561 CHRONIC PAIN OF BOTH KNEES: Status: ACTIVE | Noted: 2020-08-18

## 2023-04-25 PROBLEM — M79.673 FOOT PAIN: Status: ACTIVE | Noted: 2023-04-25

## 2023-04-25 PROBLEM — M25.529 ELBOW PAIN: Status: RESOLVED | Noted: 2023-04-25 | Resolved: 2023-04-25

## 2023-04-25 PROBLEM — E66.9 OBESITY (BMI 30-39.9): Status: ACTIVE | Noted: 2019-03-11

## 2023-04-25 PROBLEM — M79.673 FOOT PAIN: Status: RESOLVED | Noted: 2023-04-25 | Resolved: 2023-04-25

## 2023-04-25 PROBLEM — M79.641 BILATERAL HAND PAIN: Status: RESOLVED | Noted: 2018-07-11 | Resolved: 2023-04-25

## 2023-04-25 PROBLEM — M24.176 DERANGEMENT OF ANKLE OR FOOT: Status: ACTIVE | Noted: 2019-09-26

## 2023-04-25 PROBLEM — M25.521 BILATERAL ELBOW JOINT PAIN: Status: ACTIVE | Noted: 2018-07-11

## 2023-04-25 PROBLEM — G44.84 EXERTIONAL HEADACHE: Status: RESOLVED | Noted: 2023-04-25 | Resolved: 2023-04-25

## 2023-04-25 PROBLEM — M25.561 CHRONIC PAIN OF BOTH KNEES: Status: RESOLVED | Noted: 2020-08-18 | Resolved: 2023-04-25

## 2023-04-25 PROBLEM — E29.1 HYPOGONADISM IN MALE: Status: ACTIVE | Noted: 2023-04-25

## 2023-04-25 PROBLEM — M79.642 BILATERAL HAND PAIN: Status: ACTIVE | Noted: 2018-07-11

## 2023-04-25 PROBLEM — L91.8 CUTANEOUS SKIN TAGS: Status: RESOLVED | Noted: 2023-04-25 | Resolved: 2023-04-25

## 2023-04-25 PROBLEM — M19.079 PRIMARY OSTEOARTHRITIS, UNSPECIFIED ANKLE AND FOOT: Status: ACTIVE | Noted: 2019-09-26

## 2023-04-25 PROBLEM — G89.29 CHRONIC PAIN OF BOTH KNEES: Status: RESOLVED | Noted: 2020-08-18 | Resolved: 2023-04-25

## 2023-04-25 PROBLEM — H04.123 CHRONICALLY DRY EYES, BILATERAL: Status: ACTIVE | Noted: 2018-07-11

## 2023-04-25 PROBLEM — M25.562 CHRONIC PAIN OF BOTH KNEES: Status: ACTIVE | Noted: 2020-08-18

## 2023-04-25 PROBLEM — M24.173 DERANGEMENT OF ANKLE OR FOOT: Status: RESOLVED | Noted: 2019-09-26 | Resolved: 2023-04-25

## 2023-04-25 PROBLEM — M79.641 BILATERAL HAND PAIN: Status: ACTIVE | Noted: 2018-07-11

## 2023-04-25 PROBLEM — M77.30 RETROCALCANEAL BONE SPUR: Status: ACTIVE | Noted: 2019-09-26

## 2023-04-25 PROBLEM — M79.672 PAIN IN BOTH FEET: Status: ACTIVE | Noted: 2018-07-11

## 2023-04-25 PROBLEM — M19.071 PRIMARY OSTEOARTHRITIS OF BOTH FEET: Status: ACTIVE | Noted: 2019-09-26

## 2023-04-25 PROBLEM — H04.123 CHRONICALLY DRY EYES, BILATERAL: Status: RESOLVED | Noted: 2018-07-11 | Resolved: 2023-04-25

## 2023-04-25 PROBLEM — G62.9 NEUROPATHY: Status: ACTIVE | Noted: 2023-04-25

## 2023-04-25 PROBLEM — M25.60 JOINT STIFFNESS OF MULTIPLE SITES: Status: ACTIVE | Noted: 2018-07-11

## 2023-04-25 PROBLEM — G89.29 CHRONIC BILATERAL LOW BACK PAIN WITHOUT SCIATICA: Status: RESOLVED | Noted: 2018-07-11 | Resolved: 2023-04-25

## 2023-04-25 PROBLEM — M54.50 CHRONIC BILATERAL LOW BACK PAIN WITHOUT SCIATICA: Status: RESOLVED | Noted: 2018-07-11 | Resolved: 2023-04-25

## 2023-04-25 PROBLEM — M79.642 BILATERAL HAND PAIN: Status: RESOLVED | Noted: 2018-07-11 | Resolved: 2023-04-25

## 2023-04-25 PROBLEM — L91.8 CUTANEOUS SKIN TAGS: Status: ACTIVE | Noted: 2023-04-25

## 2023-04-25 PROBLEM — G44.84 EXERTIONAL HEADACHE: Status: ACTIVE | Noted: 2023-04-25

## 2023-04-25 PROBLEM — M10.9 GOUT, ARTHRITIS: Status: ACTIVE | Noted: 2023-04-25

## 2023-04-25 PROBLEM — M24.176 DERANGEMENT OF ANKLE OR FOOT: Status: RESOLVED | Noted: 2019-09-26 | Resolved: 2023-04-25

## 2023-04-25 PROBLEM — M19.072 PRIMARY OSTEOARTHRITIS OF BOTH FEET: Status: ACTIVE | Noted: 2019-09-26

## 2023-04-25 PROBLEM — M25.522 BILATERAL ELBOW JOINT PAIN: Status: ACTIVE | Noted: 2018-07-11

## 2023-04-25 PROBLEM — G89.29 CHRONIC PAIN OF BOTH KNEES: Status: ACTIVE | Noted: 2020-08-18

## 2023-04-25 PROBLEM — M25.522 BILATERAL ELBOW JOINT PAIN: Status: RESOLVED | Noted: 2018-07-11 | Resolved: 2023-04-25

## 2023-04-25 PROBLEM — M19.079 PRIMARY OSTEOARTHRITIS, UNSPECIFIED ANKLE AND FOOT: Status: RESOLVED | Noted: 2019-09-26 | Resolved: 2023-04-25

## 2023-04-25 PROBLEM — M77.30 RETROCALCANEAL BONE SPUR: Status: RESOLVED | Noted: 2019-09-26 | Resolved: 2023-04-25

## 2023-04-25 PROBLEM — M13.80 MIGRATORY POLYARTHRITIS: Status: ACTIVE | Noted: 2023-04-25

## 2023-04-25 PROBLEM — M25.539 WRIST PAIN: Status: RESOLVED | Noted: 2023-04-25 | Resolved: 2023-04-25

## 2023-04-25 PROBLEM — M25.529 ELBOW PAIN: Status: ACTIVE | Noted: 2023-04-25

## 2023-04-25 PROBLEM — M24.173 DERANGEMENT OF ANKLE OR FOOT: Status: ACTIVE | Noted: 2019-09-26

## 2023-04-25 PROBLEM — M25.539 WRIST PAIN: Status: ACTIVE | Noted: 2023-04-25

## 2023-04-25 PROBLEM — E55.9 VITAMIN D DEFICIENCY: Status: ACTIVE | Noted: 2018-07-12

## 2023-04-25 PROBLEM — M25.562 CHRONIC PAIN OF BOTH KNEES: Status: RESOLVED | Noted: 2020-08-18 | Resolved: 2023-04-25

## 2023-04-25 PROBLEM — M54.50 CHRONIC BILATERAL LOW BACK PAIN WITHOUT SCIATICA: Status: ACTIVE | Noted: 2018-07-11

## 2023-04-25 PROBLEM — E78.5 HLD (HYPERLIPIDEMIA): Status: ACTIVE | Noted: 2023-04-25

## 2023-04-25 PROBLEM — M79.672 PAIN IN BOTH FEET: Status: RESOLVED | Noted: 2018-07-11 | Resolved: 2023-04-25

## 2023-04-25 PROBLEM — G89.29 CHRONIC BILATERAL LOW BACK PAIN WITHOUT SCIATICA: Status: ACTIVE | Noted: 2018-07-11

## 2023-04-25 PROBLEM — M25.521 BILATERAL ELBOW JOINT PAIN: Status: RESOLVED | Noted: 2018-07-11 | Resolved: 2023-04-25

## 2023-04-25 PROBLEM — M79.671 PAIN IN BOTH FEET: Status: RESOLVED | Noted: 2018-07-11 | Resolved: 2023-04-25

## 2023-04-25 PROBLEM — J30.9 ALLERGIC RHINITIS: Status: ACTIVE | Noted: 2023-04-25

## 2023-04-25 PROBLEM — M79.671 PAIN IN BOTH FEET: Status: ACTIVE | Noted: 2018-07-11

## 2023-04-25 PROBLEM — N52.9 ERECTILE DYSFUNCTION: Status: ACTIVE | Noted: 2023-04-25

## 2023-04-25 PROBLEM — Q66.89: Status: ACTIVE | Noted: 2019-09-26

## 2023-04-25 PROBLEM — M25.60 JOINT STIFFNESS OF MULTIPLE SITES: Status: RESOLVED | Noted: 2018-07-11 | Resolved: 2023-04-25

## 2023-04-25 PROCEDURE — 99214 OFFICE O/P EST MOD 30 MIN: CPT | Performed by: INTERNAL MEDICINE

## 2023-04-25 PROCEDURE — 1036F TOBACCO NON-USER: CPT | Performed by: INTERNAL MEDICINE

## 2023-04-25 RX ORDER — FLUTICASONE PROPIONATE 50 MCG
SPRAY, SUSPENSION (ML) NASAL EVERY 24 HOURS
COMMUNITY
Start: 2022-12-09 | End: 2024-05-07 | Stop reason: ALTCHOICE

## 2023-04-25 RX ORDER — LIFITEGRAST 50 MG/ML
SOLUTION/ DROPS OPHTHALMIC
COMMUNITY
Start: 2020-07-23 | End: 2024-05-07 | Stop reason: WASHOUT

## 2023-04-25 RX ORDER — CHOLECALCIFEROL (VITAMIN D3) 125 MCG
1 CAPSULE ORAL DAILY
COMMUNITY
Start: 2020-12-07

## 2023-04-25 RX ORDER — BENZONATATE 200 MG/1
1 CAPSULE ORAL
COMMUNITY
Start: 2023-04-17 | End: 2024-05-07 | Stop reason: ALTCHOICE

## 2023-04-25 RX ORDER — CALCIUM CARBONATE/VITAMIN D3 600 MG-10
TABLET ORAL
COMMUNITY

## 2023-04-25 RX ORDER — MULTIVITAMIN
1 TABLET ORAL DAILY
COMMUNITY

## 2023-04-25 RX ORDER — DOXYCYCLINE 100 MG/1
1 CAPSULE ORAL
COMMUNITY
Start: 2023-04-17 | End: 2023-04-26

## 2023-04-25 RX ORDER — TESTOSTERONE 20.25 MG/1.25G
2 GEL TOPICAL DAILY
COMMUNITY
Start: 2019-08-15 | End: 2024-03-12 | Stop reason: SDUPTHER

## 2023-04-25 RX ORDER — SILDENAFIL 50 MG/1
50 TABLET, FILM COATED ORAL DAILY PRN
COMMUNITY
Start: 2019-08-26 | End: 2023-07-24 | Stop reason: SDUPTHER

## 2023-04-25 RX ORDER — PREDNISONE 10 MG/1
TABLET ORAL
Qty: 42 TABLET | Refills: 0 | Status: SHIPPED | OUTPATIENT
Start: 2023-04-25 | End: 2023-05-07

## 2023-04-25 RX ORDER — BUPROPION HYDROCHLORIDE 300 MG/1
300 TABLET ORAL DAILY
COMMUNITY
Start: 2022-12-10 | End: 2023-11-21 | Stop reason: SDUPTHER

## 2023-04-25 RX ORDER — ALLOPURINOL 300 MG/1
TABLET ORAL
COMMUNITY
Start: 2018-10-16

## 2023-04-25 ASSESSMENT — PATIENT HEALTH QUESTIONNAIRE - PHQ9
1. LITTLE INTEREST OR PLEASURE IN DOING THINGS: NOT AT ALL
2. FEELING DOWN, DEPRESSED OR HOPELESS: NOT AT ALL
SUM OF ALL RESPONSES TO PHQ9 QUESTIONS 1 AND 2: 0

## 2023-04-25 ASSESSMENT — PAIN SCALES - GENERAL: PAINLEVEL: 6

## 2023-04-25 NOTE — PROGRESS NOTES
"Subjective   Tu Tomlin is a 47 y.o. male who presents for Post Covid concerns.    HPI   Covid positive 3/30/23.  Febrile first 2-3 days. Chills 4-5 days.  Cleared via Occupational Health 4/13/23.  Encouraged pt to stay home and rest.  Went to Urgent Care 4/17/23.  Treated for Covid Pneumonia. Rx: Doxycycline, Tessalon perles.  CXR normal.  C/o continued cough, ST, headache x25 days, hot flashes vs. Chills, weakness, dizziness, fatigue.  Afebrile.  Review of Systems   Constitutional:  Positive for fatigue. Negative for fever.   Respiratory:  Positive for cough and shortness of breath.    Cardiovascular:  Negative for chest pain and leg swelling.   All other systems reviewed and are negative.      Health Maintenance Due   Topic Date Due    Yearly Adult Physical  Never done    Hepatitis B Vaccines (1 of 3 - 3-dose series) Never done    HIV Screening  Never done    MMR Vaccines (1 of 1 - Standard series) Never done    Hepatitis C Screening  Never done    Diabetes Screening  Never done    COVID-19 Vaccine (5 - Booster for Moderna series) 12/16/2022       Objective   /82   Pulse 76   Temp 36.8 °C (98.2 °F)   Resp 16   Ht 1.905 m (6' 3\")   Wt 113 kg (250 lb)   SpO2 97%   BMI 31.25 kg/m²     Physical Exam  Vitals and nursing note reviewed.   Constitutional:       Appearance: Normal appearance.   HENT:      Head: Normocephalic.   Eyes:      Conjunctiva/sclera: Conjunctivae normal.      Pupils: Pupils are equal, round, and reactive to light.   Cardiovascular:      Rate and Rhythm: Normal rate and regular rhythm.      Pulses: Normal pulses.      Heart sounds: Normal heart sounds.   Pulmonary:      Effort: Pulmonary effort is normal.      Breath sounds: Normal breath sounds.   Musculoskeletal:         General: No swelling.      Cervical back: Neck supple.   Skin:     General: Skin is warm and dry.   Neurological:      General: No focal deficit present.      Mental Status: He is oriented to person, place, and " time.         Assessment/Plan   Problem List Items Addressed This Visit    None  Visit Diagnoses       COVID-19    -  Primary    Relevant Medications    predniSONE (Deltasone) 10 mg tablet    Postviral fatigue syndrome        Other headache syndrome              Reviewed cxr  Steroid  Off work this week  Home next week for work   Call if problems

## 2023-04-26 ASSESSMENT — ENCOUNTER SYMPTOMS
FEVER: 0
SHORTNESS OF BREATH: 1
FATIGUE: 1
COUGH: 1

## 2023-07-24 DIAGNOSIS — N52.9 ERECTILE DYSFUNCTION, UNSPECIFIED ERECTILE DYSFUNCTION TYPE: ICD-10-CM

## 2023-07-24 DIAGNOSIS — E29.1 HYPOGONADISM IN MALE: ICD-10-CM

## 2023-07-24 RX ORDER — SILDENAFIL 50 MG/1
50 TABLET, FILM COATED ORAL DAILY PRN
Qty: 12 TABLET | Refills: 11 | Status: SHIPPED | OUTPATIENT
Start: 2023-07-24

## 2023-11-21 DIAGNOSIS — F32.1 MAJOR DEPRESSIVE DISORDER, SINGLE EPISODE, MODERATE (MULTI): Primary | ICD-10-CM

## 2023-11-21 RX ORDER — BUPROPION HYDROCHLORIDE 300 MG/1
300 TABLET ORAL DAILY
Qty: 90 TABLET | Refills: 1 | Status: SHIPPED
Start: 2023-11-21 | End: 2024-05-07 | Stop reason: SDUPTHER

## 2024-01-24 ENCOUNTER — TELEPHONE (OUTPATIENT)
Dept: PEDIATRICS | Facility: CLINIC | Age: 49
End: 2024-01-24
Payer: COMMERCIAL

## 2024-01-24 NOTE — TELEPHONE ENCOUNTER
Pt called to r/s his physical on 4/16 states he was told dr rashid was not in that day. I scheduled on 5/7 at 8:00 just JEANNINE

## 2024-03-05 ENCOUNTER — LAB (OUTPATIENT)
Dept: LAB | Facility: LAB | Age: 49
End: 2024-03-05
Payer: COMMERCIAL

## 2024-03-05 ENCOUNTER — OFFICE VISIT (OUTPATIENT)
Dept: ENDOCRINOLOGY | Facility: CLINIC | Age: 49
End: 2024-03-05
Payer: COMMERCIAL

## 2024-03-05 VITALS
BODY MASS INDEX: 32.33 KG/M2 | DIASTOLIC BLOOD PRESSURE: 88 MMHG | WEIGHT: 260 LBS | SYSTOLIC BLOOD PRESSURE: 131 MMHG | HEIGHT: 75 IN

## 2024-03-05 DIAGNOSIS — R79.89 LOW TESTOSTERONE: ICD-10-CM

## 2024-03-05 DIAGNOSIS — E29.1 HYPOGONADISM MALE: ICD-10-CM

## 2024-03-05 DIAGNOSIS — E29.1 HYPOGONADISM MALE: Primary | ICD-10-CM

## 2024-03-05 LAB
ERYTHROCYTE [DISTWIDTH] IN BLOOD BY AUTOMATED COUNT: 12.9 % (ref 11.5–14.5)
HCT VFR BLD AUTO: 49.5 % (ref 41–52)
HGB BLD-MCNC: 16.1 G/DL (ref 13.5–17.5)
MCH RBC QN AUTO: 29.7 PG (ref 26–34)
MCHC RBC AUTO-ENTMCNC: 32.5 G/DL (ref 32–36)
MCV RBC AUTO: 91 FL (ref 80–100)
NRBC BLD-RTO: 0 /100 WBCS (ref 0–0)
PLATELET # BLD AUTO: 307 X10*3/UL (ref 150–450)
RBC # BLD AUTO: 5.43 X10*6/UL (ref 4.5–5.9)
WBC # BLD AUTO: 8.9 X10*3/UL (ref 4.4–11.3)

## 2024-03-05 PROCEDURE — 99214 OFFICE O/P EST MOD 30 MIN: CPT | Performed by: STUDENT IN AN ORGANIZED HEALTH CARE EDUCATION/TRAINING PROGRAM

## 2024-03-05 PROCEDURE — 36415 COLL VENOUS BLD VENIPUNCTURE: CPT

## 2024-03-05 PROCEDURE — 1036F TOBACCO NON-USER: CPT | Performed by: STUDENT IN AN ORGANIZED HEALTH CARE EDUCATION/TRAINING PROGRAM

## 2024-03-05 PROCEDURE — 84402 ASSAY OF FREE TESTOSTERONE: CPT

## 2024-03-05 PROCEDURE — 85027 COMPLETE CBC AUTOMATED: CPT

## 2024-03-05 ASSESSMENT — ENCOUNTER SYMPTOMS
CONSTITUTIONAL NEGATIVE: 1
ACTIVITY CHANGE: 0

## 2024-03-05 NOTE — PROGRESS NOTES
"Subjective   Patient ID: Tu Tomlin \"ANGY\" is a 48 y.o. male who presents for New Patient Visit (Transition from Dr. Kimble) and Hypogonadism (Used to be followed by Dr. Kimble (last seen 2/2022), prior was Dr. Villa /PCP: Ranjit /Dx: around 2016/MRI brain around 2013 that was normal- none recently /Current regimen: testosterone 1.62% - 2 depressions daily /Was on CPAP; lost weight now no longer needs /Last testosterone labs 6/2023).  HPI  The patient is a 48-year-old male with history of hypogonadism diagnosed ~8 years ago presents to establish care.  He previously followed with Dr. Villa briefly  and did see [facility] endocrinologist once.  It appears that his initial cause for hypogonadism was related to obesity and obstructive sleep apnea for which he was on CPAP machine.  Over the past 3 years he lost almost 100 pounds but did gain almost 20 pounds after COVID infection due to reduced activity at that time.  His sleep apnea has since resolved and he is off the CPAP per his sleep medicine specialist now.  He states that before being on testosterone he was tired had low sexual drive and was depressed.  All of these aspects had since improved.  He did have an MRI almost 10 years ago for cluster headaches and no abnormality was found.  Particularly no pituitary adenoma.  He is currently on testosterone gel 20.25 2 pumps a day 1 on each shoulder.    Does not use narcotics or any illicit drugs  He works for [employer]   He no plans to have children    Review of Systems   Constitutional: Negative.  Negative for activity change.       Objective   Physical Exam  Constitutional:       Appearance: Normal appearance.   Cardiovascular:      Rate and Rhythm: Normal rate and regular rhythm.   Pulmonary:      Effort: Pulmonary effort is normal.      Breath sounds: Normal breath sounds.   Neurological:      Mental Status: He is alert.   Psychiatric:         Mood and Affect: Mood normal.      Visit Vitals  /88   Ht " "1.905 m (6' 3\")   Wt 118 kg (260 lb)   BMI 32.50 kg/m²   Smoking Status Never   BSA 2.5 m²        Assessment/Plan        Patient with longstanding hypogonadism likely secondary to obesity and sleep apnea.  It appears that with significant weight loss his sleep apnea had resolved and he is now off CPAP and BMI is down to 32.  We discussed that this likely would improve his testosterone production significantly. He is currently on testosterone gel 20.25 2 pumps a day , we discussed lowering to 1 pump if testosterone level is more than 500.  He does not want to completely wean off testosterone given significant symptoms at time of diagnosis.    Labs today  CSA signed  RTC in 3 months         "

## 2024-03-12 DIAGNOSIS — R79.89 LOW TESTOSTERONE: Primary | ICD-10-CM

## 2024-03-12 RX ORDER — TESTOSTERONE 20.25 MG/1.25G
1 GEL TOPICAL DAILY
Qty: 75 G | Refills: 0 | Status: SHIPPED | OUTPATIENT
Start: 2024-03-12 | End: 2024-06-10

## 2024-03-13 LAB
TESTOSTERONE FREE (CHAN): 170.1 PG/ML (ref 35–155)
TESTOSTERONE,TOTAL,LC-MS/MS: 806 NG/DL (ref 250–1100)

## 2024-04-01 ENCOUNTER — APPOINTMENT (OUTPATIENT)
Dept: PRIMARY CARE | Facility: CLINIC | Age: 49
End: 2024-04-01
Payer: COMMERCIAL

## 2024-04-16 ENCOUNTER — APPOINTMENT (OUTPATIENT)
Dept: PRIMARY CARE | Facility: CLINIC | Age: 49
End: 2024-04-16
Payer: COMMERCIAL

## 2024-05-05 ASSESSMENT — PROMIS GLOBAL HEALTH SCALE
RATE_SOCIAL_SATISFACTION: VERY GOOD
CARRYOUT_SOCIAL_ACTIVITIES: VERY GOOD
CARRYOUT_PHYSICAL_ACTIVITIES: MOSTLY
EMOTIONAL_PROBLEMS: SOMETIMES
RATE_QUALITY_OF_LIFE: GOOD
RATE_MENTAL_HEALTH: GOOD
RATE_AVERAGE_PAIN: 2
RATE_PHYSICAL_HEALTH: GOOD
RATE_GENERAL_HEALTH: GOOD

## 2024-05-07 ENCOUNTER — OFFICE VISIT (OUTPATIENT)
Dept: PRIMARY CARE | Facility: CLINIC | Age: 49
End: 2024-05-07
Payer: COMMERCIAL

## 2024-05-07 VITALS
RESPIRATION RATE: 16 BRPM | WEIGHT: 265 LBS | TEMPERATURE: 98.2 F | BODY MASS INDEX: 32.95 KG/M2 | HEIGHT: 75 IN | HEART RATE: 72 BPM | DIASTOLIC BLOOD PRESSURE: 82 MMHG | SYSTOLIC BLOOD PRESSURE: 126 MMHG | OXYGEN SATURATION: 100 %

## 2024-05-07 DIAGNOSIS — Z00.00 HEALTH CARE MAINTENANCE: Primary | ICD-10-CM

## 2024-05-07 DIAGNOSIS — F32.1 MAJOR DEPRESSIVE DISORDER, SINGLE EPISODE, MODERATE (MULTI): ICD-10-CM

## 2024-05-07 PROCEDURE — 99396 PREV VISIT EST AGE 40-64: CPT | Performed by: INTERNAL MEDICINE

## 2024-05-07 PROCEDURE — 1036F TOBACCO NON-USER: CPT | Performed by: INTERNAL MEDICINE

## 2024-05-07 RX ORDER — BUPROPION HYDROCHLORIDE 300 MG/1
300 TABLET ORAL DAILY
Qty: 90 TABLET | Refills: 3 | Status: SHIPPED | OUTPATIENT
Start: 2024-05-07

## 2024-05-07 ASSESSMENT — PATIENT HEALTH QUESTIONNAIRE - PHQ9
2. FEELING DOWN, DEPRESSED OR HOPELESS: NOT AT ALL
1. LITTLE INTEREST OR PLEASURE IN DOING THINGS: NOT AT ALL
SUM OF ALL RESPONSES TO PHQ9 QUESTIONS 1 AND 2: 0

## 2024-05-07 ASSESSMENT — ENCOUNTER SYMPTOMS
COUGH: 0
FEVER: 0
SHORTNESS OF BREATH: 0
FATIGUE: 0

## 2024-05-07 NOTE — PROGRESS NOTES
"Lian Tomlin \"RJ\" is a 49 y.o. male who presents for Annual Exam.    HPI   Influenza 2024  Covid 2021, 2022, 2024  Tdap 2018  Colonoscopy 2022 10 yr  Bmi 33  Eye exam 24 reg  Depression screen 24        Review of Systems   Constitutional:  Negative for fatigue and fever.   Respiratory:  Negative for cough and shortness of breath.    Cardiovascular:  Negative for chest pain and leg swelling.   All other systems reviewed and are negative.      Health Maintenance Due   Topic Date Due    Yearly Adult Physical  Never done    HIV Screening  Never done    MMR Vaccines (1 of 1 - Standard series) Never done    Hepatitis C Screening  Never done    Diabetes Screening  Never done    Hepatitis B Vaccines (1 of 3 - 19+ 3-dose series) Never done       Objective   /82   Pulse 72   Temp 36.8 °C (98.2 °F)   Resp 16   Ht 1.905 m (6' 3\")   Wt 120 kg (265 lb)   SpO2 100%   BMI 33.12 kg/m²     Physical Exam  Vitals and nursing note reviewed.   Constitutional:       Appearance: Normal appearance.   HENT:      Head: Normocephalic.   Eyes:      Conjunctiva/sclera: Conjunctivae normal.      Pupils: Pupils are equal, round, and reactive to light.   Cardiovascular:      Rate and Rhythm: Normal rate and regular rhythm.      Pulses: Normal pulses.      Heart sounds: Normal heart sounds.   Pulmonary:      Effort: Pulmonary effort is normal.      Breath sounds: Normal breath sounds.   Musculoskeletal:         General: No swelling.      Cervical back: Neck supple.   Skin:     General: Skin is warm and dry.   Neurological:      General: No focal deficit present.      Mental Status: He is oriented to person, place, and time.         Assessment/Plan   Problem List Items Addressed This Visit    None  Visit Diagnoses       Health care maintenance    -  Primary    Relevant Orders    CBC    Comprehensive Metabolic Panel    Lipid Panel    Thyroid Stimulating Hormone    Vitamin B12    Vitamin D 25-Hydroxy,Total (for eval of " Vitamin D levels)    Uric acid    Hemoglobin A1C    Major depressive disorder, single episode, moderate (Multi)        Relevant Medications    buPROPion XL (Wellbutrin XL) 300 mg 24 hr tablet

## 2024-06-10 ENCOUNTER — LAB (OUTPATIENT)
Dept: LAB | Facility: LAB | Age: 49
End: 2024-06-10
Payer: COMMERCIAL

## 2024-06-10 ENCOUNTER — OFFICE VISIT (OUTPATIENT)
Dept: ENDOCRINOLOGY | Facility: CLINIC | Age: 49
End: 2024-06-10
Payer: COMMERCIAL

## 2024-06-10 VITALS
DIASTOLIC BLOOD PRESSURE: 78 MMHG | WEIGHT: 268 LBS | HEIGHT: 75 IN | SYSTOLIC BLOOD PRESSURE: 100 MMHG | BODY MASS INDEX: 33.32 KG/M2

## 2024-06-10 DIAGNOSIS — R79.89 LOW TESTOSTERONE: ICD-10-CM

## 2024-06-10 DIAGNOSIS — R79.89 LOW TESTOSTERONE: Primary | ICD-10-CM

## 2024-06-10 PROCEDURE — 36415 COLL VENOUS BLD VENIPUNCTURE: CPT

## 2024-06-10 PROCEDURE — 99214 OFFICE O/P EST MOD 30 MIN: CPT | Performed by: STUDENT IN AN ORGANIZED HEALTH CARE EDUCATION/TRAINING PROGRAM

## 2024-06-10 PROCEDURE — 84402 ASSAY OF FREE TESTOSTERONE: CPT

## 2024-06-10 NOTE — PROGRESS NOTES
"Subjective   Patient ID: Tu Tomlin \"ANGY\" is a 49 y.o. male who presents for Hypogonadism (Last testosterone level and CSA done 3/5/24 .  No complaints voiced).  HPI  The patient is a 48-year-old male with history of hypogonadism diagnosed ~8 years ago presents for follow up.  He is doing well on 1 pump of Testosterone a day   He  will get labs after the visit     History    He previously followed with Dr. Villa briefly  and did see [facility] endocrinologist once.  It appears that his initial cause for hypogonadism was related to obesity and obstructive sleep apnea for which he was on CPAP machine.  Over the past 3 years he lost almost 100 pounds but did gain almost 20 pounds after COVID infection due to reduced activity at that time.  His sleep apnea has since resolved and he is off the CPAP per his sleep medicine specialist now.  He states that before being on testosterone he was tired had low sexual drive and was depressed.  All of these aspects had since improved.  He did have an MRI almost 10 years ago for cluster headaches and no abnormality was found.  Particularly no pituitary adenoma.  He is currently on testosterone gel 20.25 2 pumps a day 1 on each shoulder.     Does not use narcotics or any illicit drugs  He works for [employer]   He no plans to have children     Review of Systems    Objective   Physical Exam  Constitutional:       Appearance: Normal appearance.   Cardiovascular:      Rate and Rhythm: Normal rate and regular rhythm.   Pulmonary:      Effort: Pulmonary effort is normal.      Breath sounds: Normal breath sounds.   Neurological:      General: No focal deficit present.      Mental Status: He is alert.   Psychiatric:         Mood and Affect: Mood normal.      Visit Vitals  /78   Ht 1.905 m (6' 3\")   Wt 122 kg (268 lb)   BMI 33.50 kg/m²   Smoking Status Never   BSA 2.54 m²        Assessment/Plan        Patient with longstanding history of  hypogonadism likely secondary to obesity and " sleep apnea.  It appears that with significant weight loss his sleep apnea had resolved and he is now off CPAP and BMI is down to 33  We discussed that this likely improved his testosterone production significantly. He is currently on testosterone gel 20.25 1 pump a day ,  He had supraphysiologic levels on 2 pumps daily     Labs today  CSA signed 3/2024  OARRS reviewed   RTC in 3 months          80

## 2024-06-14 LAB
TESTOSTERONE FREE (CHAN): 51.5 PG/ML (ref 35–155)
TESTOSTERONE,TOTAL,LC-MS/MS: 231 NG/DL (ref 250–1100)

## 2024-06-25 DIAGNOSIS — R79.89 LOW TESTOSTERONE: ICD-10-CM

## 2024-06-25 RX ORDER — TESTOSTERONE 20.25 MG/1.25G
1 GEL TOPICAL DAILY
Qty: 75 G | Refills: 1 | Status: SHIPPED | OUTPATIENT
Start: 2024-06-25 | End: 2024-09-23

## 2024-09-10 ENCOUNTER — LAB (OUTPATIENT)
Dept: LAB | Facility: LAB | Age: 49
End: 2024-09-10
Payer: COMMERCIAL

## 2024-09-10 ENCOUNTER — APPOINTMENT (OUTPATIENT)
Dept: ENDOCRINOLOGY | Facility: CLINIC | Age: 49
End: 2024-09-10
Payer: COMMERCIAL

## 2024-09-10 VITALS
WEIGHT: 265 LBS | DIASTOLIC BLOOD PRESSURE: 86 MMHG | SYSTOLIC BLOOD PRESSURE: 128 MMHG | BODY MASS INDEX: 32.95 KG/M2 | HEIGHT: 75 IN

## 2024-09-10 DIAGNOSIS — E29.1 HYPOGONADISM MALE: ICD-10-CM

## 2024-09-10 DIAGNOSIS — R79.89 LOW TESTOSTERONE: ICD-10-CM

## 2024-09-10 DIAGNOSIS — Z00.00 HEALTH CARE MAINTENANCE: ICD-10-CM

## 2024-09-10 DIAGNOSIS — E29.1 HYPOGONADISM MALE: Primary | ICD-10-CM

## 2024-09-10 LAB
25(OH)D3 SERPL-MCNC: 50 NG/ML (ref 30–100)
ALBUMIN SERPL BCP-MCNC: 4.4 G/DL (ref 3.4–5)
ALP SERPL-CCNC: 68 U/L (ref 33–120)
ALT SERPL W P-5'-P-CCNC: 19 U/L (ref 10–52)
ANION GAP SERPL CALC-SCNC: 13 MMOL/L (ref 10–20)
AST SERPL W P-5'-P-CCNC: 17 U/L (ref 9–39)
BILIRUB SERPL-MCNC: 0.9 MG/DL (ref 0–1.2)
BUN SERPL-MCNC: 18 MG/DL (ref 6–23)
CALCIUM SERPL-MCNC: 9.7 MG/DL (ref 8.6–10.3)
CHLORIDE SERPL-SCNC: 103 MMOL/L (ref 98–107)
CHOLEST SERPL-MCNC: 219 MG/DL (ref 0–199)
CHOLESTEROL/HDL RATIO: 5
CO2 SERPL-SCNC: 23 MMOL/L (ref 21–32)
CREAT SERPL-MCNC: 1.22 MG/DL (ref 0.5–1.3)
EGFRCR SERPLBLD CKD-EPI 2021: 74 ML/MIN/1.73M*2
ERYTHROCYTE [DISTWIDTH] IN BLOOD BY AUTOMATED COUNT: 12.8 % (ref 11.5–14.5)
EST. AVERAGE GLUCOSE BLD GHB EST-MCNC: 103 MG/DL
GLUCOSE SERPL-MCNC: 81 MG/DL (ref 74–99)
HBA1C MFR BLD: 5.2 %
HCT VFR BLD AUTO: 45 % (ref 41–52)
HDLC SERPL-MCNC: 43.7 MG/DL
HGB BLD-MCNC: 15.1 G/DL (ref 13.5–17.5)
LDLC SERPL CALC-MCNC: 131 MG/DL
MCH RBC QN AUTO: 29.3 PG (ref 26–34)
MCHC RBC AUTO-ENTMCNC: 33.6 G/DL (ref 32–36)
MCV RBC AUTO: 87 FL (ref 80–100)
NON HDL CHOLESTEROL: 175 MG/DL (ref 0–149)
NRBC BLD-RTO: 0 /100 WBCS (ref 0–0)
PLATELET # BLD AUTO: 292 X10*3/UL (ref 150–450)
POTASSIUM SERPL-SCNC: 4.4 MMOL/L (ref 3.5–5.3)
PROT SERPL-MCNC: 7.2 G/DL (ref 6.4–8.2)
RBC # BLD AUTO: 5.15 X10*6/UL (ref 4.5–5.9)
SODIUM SERPL-SCNC: 135 MMOL/L (ref 136–145)
TESTOST SERPL-MCNC: 267 NG/DL (ref 240–1000)
TRIGL SERPL-MCNC: 224 MG/DL (ref 0–149)
TSH SERPL-ACNC: 2.02 MIU/L (ref 0.44–3.98)
URATE SERPL-MCNC: 6.4 MG/DL (ref 4–7.5)
VIT B12 SERPL-MCNC: 1140 PG/ML (ref 211–911)
VLDL: 45 MG/DL (ref 0–40)
WBC # BLD AUTO: 8.2 X10*3/UL (ref 4.4–11.3)

## 2024-09-10 PROCEDURE — 83036 HEMOGLOBIN GLYCOSYLATED A1C: CPT

## 2024-09-10 PROCEDURE — 80061 LIPID PANEL: CPT

## 2024-09-10 PROCEDURE — 80053 COMPREHEN METABOLIC PANEL: CPT

## 2024-09-10 PROCEDURE — 84443 ASSAY THYROID STIM HORMONE: CPT

## 2024-09-10 PROCEDURE — 82306 VITAMIN D 25 HYDROXY: CPT

## 2024-09-10 PROCEDURE — 84550 ASSAY OF BLOOD/URIC ACID: CPT

## 2024-09-10 PROCEDURE — 99214 OFFICE O/P EST MOD 30 MIN: CPT | Performed by: STUDENT IN AN ORGANIZED HEALTH CARE EDUCATION/TRAINING PROGRAM

## 2024-09-10 PROCEDURE — 3008F BODY MASS INDEX DOCD: CPT | Performed by: STUDENT IN AN ORGANIZED HEALTH CARE EDUCATION/TRAINING PROGRAM

## 2024-09-10 PROCEDURE — 36415 COLL VENOUS BLD VENIPUNCTURE: CPT

## 2024-09-10 PROCEDURE — 82607 VITAMIN B-12: CPT

## 2024-09-10 PROCEDURE — 84403 ASSAY OF TOTAL TESTOSTERONE: CPT

## 2024-09-10 PROCEDURE — 85027 COMPLETE CBC AUTOMATED: CPT

## 2024-09-10 NOTE — PROGRESS NOTES
"Subjective   Patient ID: Tu Tomlin \"ANGY\" is a 49 y.o. male who presents for low testosterone (Patient ID: Tu Tomlin \"ANGY\" is a 49 y.o. male who presents for Hypogonadism (Last testosterone level and CSA done 6/10/24 .  No complaints).  HPI  The patient is a 48-year-old male with history of hypogonadism diagnosed ~8 years ago presents for follow up.  He is doing well  , no new concern  on 1 pump of Testosterone a day        History    He previously followed with Dr. Villa briefly  and did see [facility] endocrinologist once.  It appears that his initial cause for hypogonadism was related to obesity and obstructive sleep apnea for which he was on CPAP machine.  Over the past 3 years he lost almost 100 pounds but did gain almost 20 pounds after COVID infection due to reduced activity at that time.  His sleep apnea has since resolved and he is off the CPAP per his sleep medicine specialist now.  He states that before being on testosterone he was tired had low sexual drive and was depressed.  All of these aspects had since improved.  He did have an MRI almost 10 years ago for cluster headaches and no abnormality was found.  Particularly no pituitary adenoma.  He is currently on testosterone gel 20.25 2 pumps a day 1 on each shoulder.     Does not use narcotics or any illicit drugs  He works for [employer]   He no plans to have children  Review of Systems    Objective   Physical Exam  Constitutional:       Appearance: Normal appearance.   Cardiovascular:      Rate and Rhythm: Normal rate and regular rhythm.   Pulmonary:      Effort: Pulmonary effort is normal.      Breath sounds: Normal breath sounds.   Neurological:      General: No focal deficit present.      Mental Status: He is alert.   Psychiatric:         Mood and Affect: Mood normal.      Visit Vitals  /86   Ht 1.905 m (6' 3\")   Wt 120 kg (265 lb)   BMI 33.12 kg/m²   Smoking Status Never   BSA 2.52 m²        Assessment/Plan        Patient with " longstanding history of  hypogonadism likely secondary to obesity and sleep apnea.  It appears that with significant weight loss his sleep apnea had resolved and he is now off CPAP and BMI is down to 33  We discussed that this likely improved his testosterone production significantly. He is currently on testosterone gel 20.25 1 pump a day ,  He had supraphysiologic levels on 2 pumps daily .  His levels now on lower side , advised to use 2 pumps for 3 days and 1 pump for 4 days      Labs today  CSA signed 3/2024  OARRS reviewed   RTC in 3 month

## 2024-10-21 DIAGNOSIS — R79.89 LOW TESTOSTERONE: ICD-10-CM

## 2024-10-21 RX ORDER — TESTOSTERONE 20.25 MG/1.25G
GEL TOPICAL
Qty: 225 G | Refills: 0 | Status: SHIPPED | OUTPATIENT
Start: 2024-10-21

## 2024-12-02 DIAGNOSIS — N52.9 ERECTILE DYSFUNCTION, UNSPECIFIED ERECTILE DYSFUNCTION TYPE: ICD-10-CM

## 2024-12-02 DIAGNOSIS — E29.1 HYPOGONADISM IN MALE: ICD-10-CM

## 2024-12-02 RX ORDER — SILDENAFIL 100 MG/1
100 TABLET, FILM COATED ORAL DAILY PRN
Qty: 36 TABLET | Refills: 3 | Status: SHIPPED | OUTPATIENT
Start: 2024-12-02 | End: 2025-12-02

## 2024-12-18 ENCOUNTER — APPOINTMENT (OUTPATIENT)
Dept: ENDOCRINOLOGY | Facility: CLINIC | Age: 49
End: 2024-12-18
Payer: COMMERCIAL

## 2024-12-18 VITALS
WEIGHT: 268 LBS | BODY MASS INDEX: 33.32 KG/M2 | HEIGHT: 75 IN | DIASTOLIC BLOOD PRESSURE: 86 MMHG | SYSTOLIC BLOOD PRESSURE: 124 MMHG

## 2024-12-18 DIAGNOSIS — E29.1 HYPOGONADISM MALE: Primary | ICD-10-CM

## 2024-12-18 DIAGNOSIS — R79.89 LOW TESTOSTERONE: ICD-10-CM

## 2024-12-18 PROCEDURE — 99214 OFFICE O/P EST MOD 30 MIN: CPT | Performed by: STUDENT IN AN ORGANIZED HEALTH CARE EDUCATION/TRAINING PROGRAM

## 2024-12-18 PROCEDURE — 3008F BODY MASS INDEX DOCD: CPT | Performed by: STUDENT IN AN ORGANIZED HEALTH CARE EDUCATION/TRAINING PROGRAM

## 2024-12-18 RX ORDER — TESTOSTERONE 20.25 MG/1.25G
GEL TOPICAL
Qty: 225 G | Refills: 1 | Status: SHIPPED | OUTPATIENT
Start: 2024-12-18

## 2024-12-18 NOTE — PROGRESS NOTES
"Subjective   Patient ID: Tu Tomlin \"ANGY\" is a 49 y.o. male who presents for Diabetes ( Tu Tomlin \"ANGY\" is a 49 y.o. male who presents for Hypogonadism (Last testosterone level and CSA done 9/10/24 .  Last CSA done 3/4/24/ No complaints).).  HPI  The patient is a 49-year-old male with history of hypogonadism diagnosed ~8 years ago presents for follow up.  He is doing well, no new concern today  on 1 pump of Testosterone 4 days a week and 2 pumps 3 days a week         History    He previously followed with Dr. Villa briefly  and did see [facility] endocrinologist once.  It appears that his initial cause for hypogonadism was related to obesity and obstructive sleep apnea for which he was on CPAP machine.  Over the past 3 years he lost almost 100 pounds but did gain almost 20 pounds after COVID infection due to reduced activity at that time.  His sleep apnea has since resolved and he is off the CPAP per his sleep medicine specialist now.  He states that before being on testosterone he was tired had low sexual drive and was depressed.  All of these aspects had since improved.  He did have an MRI almost 10 years ago for cluster headaches and no abnormality was found.  Particularly no pituitary adenoma.       Does not use narcotics or any illicit drugs  He works for [employer]   He no plans to have children    Review of Systems    Objective   Physical Exam Visit Vitals  /86   Ht 1.905 m (6' 3\")   Wt 122 kg (268 lb)   BMI 33.50 kg/m²   Smoking Status Never   BSA 2.54 m²        Assessment/Plan        Patient with longstanding history of  hypogonadism likely secondary to obesity and sleep apnea.  It appears that with significant weight loss his sleep apnea had resolved and he is now off CPAP and BMI is down to 33  We discussed that this likely improved his testosterone production significantly. He is currently on testosterone gel 20.25 1 pump a day ,  He had supraphysiologic levels on 2 pumps daily .His levels " now on lower side on 1 pump, He is now using 2 pumps for 3 days and 1 pump for 4 days. Labs after visit today for dose adjustment     Labs today  CSA signed 3/2024  OARRS reviewed     Advised to follow up with endocrinology in 6 months or less

## 2025-01-17 ENCOUNTER — LAB (OUTPATIENT)
Dept: LAB | Facility: LAB | Age: 50
End: 2025-01-17
Payer: COMMERCIAL

## 2025-01-17 DIAGNOSIS — E29.1 HYPOGONADISM MALE: ICD-10-CM

## 2025-01-17 LAB
ERYTHROCYTE [DISTWIDTH] IN BLOOD BY AUTOMATED COUNT: 13 % (ref 11.5–14.5)
HCT VFR BLD AUTO: 47 % (ref 41–52)
HGB BLD-MCNC: 16.1 G/DL (ref 13.5–17.5)
MCH RBC QN AUTO: 29.4 PG (ref 26–34)
MCHC RBC AUTO-ENTMCNC: 34.3 G/DL (ref 32–36)
MCV RBC AUTO: 86 FL (ref 80–100)
NRBC BLD-RTO: 0 /100 WBCS (ref 0–0)
PLATELET # BLD AUTO: 325 X10*3/UL (ref 150–450)
PSA SERPL-MCNC: 0.56 NG/ML
RBC # BLD AUTO: 5.48 X10*6/UL (ref 4.5–5.9)
TESTOST SERPL-MCNC: 509 NG/DL (ref 240–1000)
WBC # BLD AUTO: 8.3 X10*3/UL (ref 4.4–11.3)

## 2025-01-17 PROCEDURE — 85027 COMPLETE CBC AUTOMATED: CPT

## 2025-01-17 PROCEDURE — 84153 ASSAY OF PSA TOTAL: CPT

## 2025-01-17 PROCEDURE — 84403 ASSAY OF TOTAL TESTOSTERONE: CPT

## 2025-04-21 DIAGNOSIS — F32.1 MAJOR DEPRESSIVE DISORDER, SINGLE EPISODE, MODERATE (MULTI): ICD-10-CM

## 2025-04-21 RX ORDER — BUPROPION HYDROCHLORIDE 300 MG/1
300 TABLET ORAL DAILY
Qty: 90 TABLET | Refills: 3 | Status: SHIPPED | OUTPATIENT
Start: 2025-04-21

## 2025-05-07 ENCOUNTER — APPOINTMENT (OUTPATIENT)
Dept: PRIMARY CARE | Facility: CLINIC | Age: 50
End: 2025-05-07
Payer: COMMERCIAL

## 2025-05-07 VITALS
WEIGHT: 273 LBS | OXYGEN SATURATION: 100 % | DIASTOLIC BLOOD PRESSURE: 84 MMHG | HEART RATE: 88 BPM | BODY MASS INDEX: 33.94 KG/M2 | TEMPERATURE: 98.6 F | RESPIRATION RATE: 16 BRPM | HEIGHT: 75 IN | SYSTOLIC BLOOD PRESSURE: 126 MMHG

## 2025-05-07 DIAGNOSIS — Z00.00 HEALTH CARE MAINTENANCE: Primary | ICD-10-CM

## 2025-05-07 PROCEDURE — 3008F BODY MASS INDEX DOCD: CPT | Performed by: INTERNAL MEDICINE

## 2025-05-07 PROCEDURE — 99396 PREV VISIT EST AGE 40-64: CPT | Performed by: INTERNAL MEDICINE

## 2025-05-07 PROCEDURE — 1036F TOBACCO NON-USER: CPT | Performed by: INTERNAL MEDICINE

## 2025-05-07 ASSESSMENT — ENCOUNTER SYMPTOMS
COUGH: 0
FEVER: 0
SHORTNESS OF BREATH: 0
FATIGUE: 0

## 2025-05-07 NOTE — PROGRESS NOTES
"Lian Tomlin \"RJ\" is a 50 y.o. male who presents for Annual Exam.    HPI   Influenza   Covid 2021, 2022, 2024  Tdap 2018  Colonoscopy 2022 10 year  Psa 25  Bmi 34  Eye exam 25  Depression screen 25    Review of Systems   Constitutional:  Negative for fatigue and fever.   Respiratory:  Negative for cough and shortness of breath.    Cardiovascular:  Negative for chest pain and leg swelling.   All other systems reviewed and are negative.      Health Maintenance Due   Topic Date Due    HIV Screening  Never done    MMR Vaccines (1 of 1 - Standard series) Never done    Hepatitis C Screening  Never done    Hepatitis B Vaccines (1 of 3 - 19+ 3-dose series) Never done    COVID-19 Vaccine (6 - 2024-25 season) 09/01/2024    Yearly Adult Physical  05/08/2025       Objective   /84   Pulse 88   Temp 37 °C (98.6 °F)   Resp 16   Ht 1.905 m (6' 3\")   Wt 124 kg (273 lb)   SpO2 100%   BMI 34.12 kg/m²     Physical Exam  Vitals and nursing note reviewed.   Constitutional:       Appearance: Normal appearance.   HENT:      Head: Normocephalic.   Eyes:      Conjunctiva/sclera: Conjunctivae normal.      Pupils: Pupils are equal, round, and reactive to light.   Cardiovascular:      Rate and Rhythm: Normal rate and regular rhythm.      Pulses: Normal pulses.      Heart sounds: Normal heart sounds.   Pulmonary:      Effort: Pulmonary effort is normal.      Breath sounds: Normal breath sounds.   Musculoskeletal:         General: No swelling.      Cervical back: Neck supple.   Skin:     General: Skin is warm and dry.   Neurological:      General: No focal deficit present.      Mental Status: He is oriented to person, place, and time.         Assessment/Plan   Problem List Items Addressed This Visit    None  Visit Diagnoses         Health care maintenance    -  Primary    Relevant Orders    CBC    Comprehensive Metabolic Panel    Lipid Panel    Thyroid Stimulating Hormone    Vitamin B12    Vitamin D 25-Hydroxy,Total " (for eval of Vitamin D levels)          Check labs in August approx  Dad and dog passed away recently   Greiving appropriately

## 2025-05-12 DIAGNOSIS — F51.01 PRIMARY INSOMNIA: Primary | ICD-10-CM

## 2025-05-12 RX ORDER — ALPRAZOLAM 0.5 MG/1
0.5 TABLET ORAL 3 TIMES DAILY PRN
Qty: 15 TABLET | Refills: 0 | Status: SHIPPED | OUTPATIENT
Start: 2025-05-12 | End: 2025-05-17

## 2025-05-27 ENCOUNTER — APPOINTMENT (OUTPATIENT)
Facility: CLINIC | Age: 50
End: 2025-05-27
Payer: COMMERCIAL

## 2025-05-27 VITALS
HEIGHT: 75 IN | DIASTOLIC BLOOD PRESSURE: 88 MMHG | HEART RATE: 82 BPM | WEIGHT: 275 LBS | SYSTOLIC BLOOD PRESSURE: 141 MMHG | BODY MASS INDEX: 34.19 KG/M2 | TEMPERATURE: 98.1 F

## 2025-05-27 DIAGNOSIS — R79.89 LOW TESTOSTERONE: ICD-10-CM

## 2025-05-27 PROCEDURE — 99203 OFFICE O/P NEW LOW 30 MIN: CPT | Performed by: STUDENT IN AN ORGANIZED HEALTH CARE EDUCATION/TRAINING PROGRAM

## 2025-05-27 PROCEDURE — 3008F BODY MASS INDEX DOCD: CPT | Performed by: STUDENT IN AN ORGANIZED HEALTH CARE EDUCATION/TRAINING PROGRAM

## 2025-05-27 RX ORDER — TESTOSTERONE 20.25 MG/1.25G
GEL TOPICAL
Qty: 225 G | Refills: 1 | Status: SHIPPED | OUTPATIENT
Start: 2025-05-27

## 2025-05-27 NOTE — PROGRESS NOTES
Subjective   ANGY Tomlin is a 50 y.o. year old who presents for management of androgen deficiency.    Dx with hypogonadism around 2014 and has been on testosterone replacement  He previously followed with Dr. Villa briefly  and Dr. Zuhair Hope.  It appears that his initial cause for hypogonadism was related to obesity and obstructive sleep apnea for which he was on CPAP machine. Initially he lost almost 100 pounds but did gain almost 60 pounds slowly over the years.  His sleep apnea has since resolved and he is off the CPAP per his sleep medicine specialist.  He states that before being on testosterone he was tired had low sexual drive and was depressed.  All of these aspects had since improved.  He did have an MRI almost 10 years ago for cluster headaches and no abnormality was found.  Particularly no pituitary adenoma.  Was on testosterone gel 20.25  pumps a day 1 on each shoulder but levels were on the lower side so he was advised to use 2 pumps for 3 days and 1 pump for 4 days (T, TH, S,S)  He had supraphysiologic levels on 2 pumps daily .  Energy good  Applies the testosterone in the morning between 7-7:30  Sleep okay  No hot flashes  No night sweats  No issues with libido  No CP, SOB  No MCDONALD  Used to exercise but haven't been since [place]bur[place]ler  Used to do weights 3 times a week and cardio   Morning erection around 2-3 times a week  Xanax as needed since his father passed away just took it few times  Grilled chicken with veggie  Yogurt breakfast with fruits  Nuts for protein  Does not use narcotics or any illicit drugs  He works for [employer]   He no plans to have children  Vitamin D 5000IU    Medical History[1]  Surgical History[2]  Family History[3]    Current Medications[4]  Allergies[5]  Social History[6]    Review of Systems  all pertinent systems reviewed and are otherwise negative   Objective   /88 (BP Location: Left arm, Patient Position: Sitting, BP Cuff Size: Large adult)   Pulse 82   Temp  "36.7 °C (98.1 °F)   Ht 1.905 m (6' 3\")   Wt 125 kg (275 lb)   BMI 34.37 kg/m²   Physical Exam  Constitutional:       General: He is not in acute distress.     Appearance: Normal appearance.   HENT:      Head: Normocephalic and atraumatic.   Eyes:      Extraocular Movements: Extraocular movements intact.      Pupils: Pupils are equal, round, and reactive to light.   Cardiovascular:      Rate and Rhythm: Normal rate and regular rhythm.   Pulmonary:      Effort: Pulmonary effort is normal. No respiratory distress.      Breath sounds: Normal breath sounds.   Abdominal:      General: Bowel sounds are normal.      Palpations: Abdomen is soft.      Tenderness: There is no abdominal tenderness.   Skin:     Coloration: Skin is not jaundiced or pale.      Findings: No erythema or rash.   Neurological:      General: No focal deficit present.      Mental Status: He is alert and oriented to person, place, and time.      Deep Tendon Reflexes: Reflexes normal.   Psychiatric:         Mood and Affect: Mood normal.         Behavior: Behavior normal.         Lab Review:   Latest Reference Range & Units 03/05/24 14:26 06/10/24 15:09 09/10/24 10:30 01/17/25 09:28   Hemoglobin A1C see below %   5.2    Estimated Average Glucose Not Established mg/dL   103    Thyroid Stimulating Hormone 0.44 - 3.98 mIU/L   2.02    Vitamin D, 25-Hydroxy, Total 30 - 100 ng/mL   50    Testosterone 240 - 1,000 ng/dL   267 509   GLUCOSE 74 - 99 mg/dL   81    Testosterone, Total, LC-MS/ - 1100 ng/dL 806 231 (L)     Testosterone, Free 35.0 - 155.0 pg/mL 170.1 (H) 51.5     Prostate Specific Antigen,Screen <=4.00 ng/mL    0.56   (L): Data is abnormally low  (H): Data is abnormally high   Latest Reference Range & Units 09/10/24 10:30 01/17/25 09:28   WBC 4.4 - 11.3 x10*3/uL 8.2 8.3   nRBC 0.0 - 0.0 /100 WBCs 0.0 0.0   RBC 4.50 - 5.90 x10*6/uL 5.15 5.48   HEMOGLOBIN 13.5 - 17.5 g/dL 15.1 16.1   HEMATOCRIT 41.0 - 52.0 % 45.0 47.0   MCV 80 - 100 fL 87 86   MCH " 26.0 - 34.0 pg 29.3 29.4   MCHC 32.0 - 36.0 g/dL 33.6 34.3   RED CELL DISTRIBUTION WIDTH 11.5 - 14.5 % 12.8 13.0   Platelets 150 - 450 x10*3/uL 292 325       Assessment/Plan   ANGY Tomlin is a 50 y.o. year old who presents for management of androgen deficiency.    Dx with hypogonadism around 2014 and has been on testosterone replacement  He previously followed with Dr. Villa briefly  and Dr. Zuhair Hope.  It appears that his initial cause for hypogonadism was related to obesity and obstructive sleep apnea for which he was on CPAP machine. Initially he lost almost 100 pounds but did gain almost 60 pounds slowly over the years.  His sleep apnea has since resolved and he is off the CPAP per his sleep medicine specialist.  He states that before being on testosterone he was tired had low sexual drive and was depressed.  All of these aspects had since improved.  Was on testosterone gel 20.25  pumps a day 1 on each shoulder but levels were on the lower side so he was advised to use 2 pumps for 3 days and 1 pump for 4 days (T, TH, S,S). He had supraphysiologic levels on 2 pumps daily .  Plan:  Continue same  Blood work  Work on your diet and exercise    RTC in 6 months           [1]   Past Medical History:  Diagnosis Date    Allergic     Anxiety     Arthritis     Depression     Headache     Visual impairment    [2]   Past Surgical History:  Procedure Laterality Date    OTHER SURGICAL HISTORY  06/03/2022    Rhinoplasty    WISDOM TOOTH EXTRACTION     [3]   Family History  Problem Relation Name Age of Onset    Breast cancer Mother Sheila Tomlin     Alcohol abuse Father Tu Tomlin     COPD Father Tu Tomlin     Hearing loss Father Tu Tomlin     Hypertension Father Tu Tomlin    [4]   Current Outpatient Medications   Medication Sig Dispense Refill    allopurinol (Zyloprim) 300 mg tablet 1 tablet Orally alternating with 150mg every other day      ALPRAZolam (Xanax) 0.5 mg tablet Take 1 tablet (0.5 mg) by  mouth 3 times a day as needed for sleep for up to 5 days. 15 tablet 0    buPROPion XL (Wellbutrin XL) 300 mg 24 hr tablet Take 1 tablet (300 mg) by mouth once daily. 90 tablet 3    cholecalciferol (Vitamin D-3) 125 MCG (5000 UT) capsule Take 1 capsule (125 mcg) by mouth once daily.      multivitamin tablet Take 1 tablet by mouth once daily.      omega-3 fatty acids-fish oil (One-Per-Day Omega-3) 684-1,200 mg capsule Take by mouth.      sildenafil (Viagra) 100 mg tablet Take 1 tablet (100 mg) by mouth once daily as needed for erectile dysfunction. 36 tablet 3    testosterone 20.25 mg/1.25 gram (1.62 %) gel in metered-dose pump 2 depressions for 3 days and 1 depression for 4 days 225 g 1     No current facility-administered medications for this visit.   [5]   Allergies  Allergen Reactions    Penicillins Hives and Unknown    Sulfa (Sulfonamide Antibiotics) Unknown    Tetracycline Unknown    Cefaclor Hives and Rash   [6]   Social History  Socioeconomic History    Marital status:    Tobacco Use    Smoking status: Never    Smokeless tobacco: Never   Vaping Use    Vaping status: Never Used   Substance and Sexual Activity    Alcohol use: Never    Drug use: Never    Sexual activity: Yes     Partners: Female     Birth control/protection: None

## 2025-05-28 LAB
25(OH)D3+25(OH)D2 SERPL-MCNC: 59 NG/ML (ref 30–100)
ALBUMIN SERPL-MCNC: 4.7 G/DL (ref 3.6–5.1)
ALP SERPL-CCNC: 58 U/L (ref 36–130)
ALT SERPL-CCNC: 20 U/L (ref 9–46)
ANION GAP SERPL CALCULATED.4IONS-SCNC: 10 MMOL/L (CALC) (ref 7–17)
AST SERPL-CCNC: 17 U/L (ref 10–40)
BILIRUB SERPL-MCNC: 0.5 MG/DL (ref 0.2–1.2)
BUN SERPL-MCNC: 14 MG/DL (ref 7–25)
CALCIUM SERPL-MCNC: 9.9 MG/DL (ref 8.6–10.3)
CHLORIDE SERPL-SCNC: 105 MMOL/L (ref 98–110)
CHOLEST SERPL-MCNC: 233 MG/DL
CHOLEST/HDLC SERPL: 4.4 (CALC)
CO2 SERPL-SCNC: 24 MMOL/L (ref 20–32)
CREAT SERPL-MCNC: 1.06 MG/DL (ref 0.6–1.29)
EGFRCR SERPLBLD CKD-EPI 2021: 87 ML/MIN/1.73M2
ERYTHROCYTE [DISTWIDTH] IN BLOOD BY AUTOMATED COUNT: 13.1 % (ref 11–15)
GLUCOSE SERPL-MCNC: 84 MG/DL (ref 65–99)
HCT VFR BLD AUTO: 50.4 % (ref 38.5–50)
HDLC SERPL-MCNC: 53 MG/DL
HGB BLD-MCNC: 16 G/DL (ref 13.2–17.1)
LDLC SERPL CALC-MCNC: 141 MG/DL (CALC)
MCH RBC QN AUTO: 29.3 PG (ref 27–33)
MCHC RBC AUTO-ENTMCNC: 31.7 G/DL (ref 32–36)
MCV RBC AUTO: 92.1 FL (ref 80–100)
NONHDLC SERPL-MCNC: 180 MG/DL (CALC)
PLATELET # BLD AUTO: 286 THOUSAND/UL (ref 140–400)
PMV BLD REES-ECKER: 10.1 FL (ref 7.5–12.5)
POTASSIUM SERPL-SCNC: 4.5 MMOL/L (ref 3.5–5.3)
PROT SERPL-MCNC: 7.4 G/DL (ref 6.1–8.1)
PSA SERPL-MCNC: 0.55 NG/ML
RBC # BLD AUTO: 5.47 MILLION/UL (ref 4.2–5.8)
SODIUM SERPL-SCNC: 139 MMOL/L (ref 135–146)
TESTOST FREE SERPL-MCNC: NORMAL PG/ML
TESTOST SERPL-MCNC: NORMAL NG/DL
TRIGL SERPL-MCNC: 255 MG/DL
TSH SERPL-ACNC: 1.66 MIU/L (ref 0.4–4.5)
VIT B12 SERPL-MCNC: 929 PG/ML (ref 200–1100)
WBC # BLD AUTO: 8.2 THOUSAND/UL (ref 3.8–10.8)

## 2025-06-02 LAB
PSA SERPL-MCNC: 0.55 NG/ML
TESTOST FREE SERPL-MCNC: 68.1 PG/ML (ref 35–155)
TESTOST SERPL-MCNC: 387 NG/DL (ref 250–1100)

## 2025-06-25 ENCOUNTER — OFFICE VISIT (OUTPATIENT)
Dept: PRIMARY CARE | Facility: CLINIC | Age: 50
End: 2025-06-25
Payer: COMMERCIAL

## 2025-06-25 VITALS
DIASTOLIC BLOOD PRESSURE: 78 MMHG | TEMPERATURE: 98.7 F | BODY MASS INDEX: 33.75 KG/M2 | RESPIRATION RATE: 16 BRPM | SYSTOLIC BLOOD PRESSURE: 128 MMHG | WEIGHT: 270 LBS | HEART RATE: 87 BPM | OXYGEN SATURATION: 99 %

## 2025-06-25 DIAGNOSIS — R68.84 JAW PAIN: Primary | ICD-10-CM

## 2025-06-25 PROCEDURE — 99213 OFFICE O/P EST LOW 20 MIN: CPT | Performed by: NURSE PRACTITIONER

## 2025-06-25 PROCEDURE — 1036F TOBACCO NON-USER: CPT | Performed by: NURSE PRACTITIONER

## 2025-06-25 RX ORDER — AZITHROMYCIN 250 MG/1
TABLET, FILM COATED ORAL
COMMUNITY
Start: 2025-06-24

## 2025-06-25 RX ORDER — NAPROXEN 500 MG/1
500 TABLET ORAL 2 TIMES DAILY PRN
Qty: 20 TABLET | Refills: 0 | Status: SHIPPED | OUTPATIENT
Start: 2025-06-25 | End: 2025-07-05

## 2025-06-25 RX ORDER — CLINDAMYCIN HYDROCHLORIDE 300 MG/1
300 CAPSULE ORAL 3 TIMES DAILY
Qty: 30 CAPSULE | Refills: 0 | Status: SHIPPED
Start: 2025-06-25 | End: 2025-06-27 | Stop reason: ALTCHOICE

## 2025-06-25 ASSESSMENT — ENCOUNTER SYMPTOMS
VOMITING: 0
FEVER: 0
ACTIVITY CHANGE: 0
APPETITE CHANGE: 1
CHILLS: 0
HEADACHES: 1
SORE THROAT: 0
COUGH: 1
SLEEP DISTURBANCE: 1
DIARRHEA: 0
NAUSEA: 0

## 2025-06-25 NOTE — PROGRESS NOTES
Subjective   Patient ID: ANGY Tomlin is a 50 y.o. male who presents for Oral Pain.    PT saw emergency dentist - was told teeth are ok.    L sided jaw pain  Teeth hurt  Went to dentist; not a tooth issue. Xray completed  Dentist prescribed z-pack  Having sensitivity to hot and cold  Swelling of face  Denies any cold symptoms    OTC-advil and tylenol    Oral Pain   This is a new problem. The current episode started in the past 7 days (1 day). The problem occurs constantly. The problem has been gradually worsening. Associated symptoms include facial pain and thermal sensitivity. Pertinent negatives include no fever. He has tried NSAIDs for the symptoms. The treatment provided mild relief.        Review of Systems   Constitutional:  Positive for appetite change. Negative for activity change, chills and fever.   HENT:  Positive for congestion and dental problem. Negative for ear pain and sore throat.    Respiratory:  Positive for cough.    Gastrointestinal:  Negative for diarrhea, nausea and vomiting.   Neurological:  Positive for headaches.   Psychiatric/Behavioral:  Positive for sleep disturbance.        Objective   /78   Pulse 87   Temp 37.1 °C (98.7 °F)   Resp 16   Wt 122 kg (270 lb)   SpO2 99%   BMI 33.75 kg/m²     Physical Exam  Vitals reviewed.   Constitutional:       General: He is awake. He is not in acute distress.     Appearance: Normal appearance. He is well-developed and well-groomed. He is not ill-appearing or toxic-appearing.   HENT:      Head: Normocephalic.      Jaw: Tenderness and swelling present. No pain on movement.        Comments: Mild swelling and pain in front of L ear     Nose: Nose normal.      Mouth/Throat:      Lips: Pink.      Mouth: Mucous membranes are moist.      Dentition: Normal dentition. No dental caries or dental abscesses.      Pharynx: Oropharynx is clear.   Eyes:      Extraocular Movements: Extraocular movements intact.      Conjunctiva/sclera: Conjunctivae normal.       Pupils: Pupils are equal, round, and reactive to light.   Cardiovascular:      Rate and Rhythm: Normal rate and regular rhythm.      Pulses: Normal pulses.      Heart sounds: Normal heart sounds.   Pulmonary:      Effort: Pulmonary effort is normal.      Breath sounds: Normal breath sounds.   Musculoskeletal:      Cervical back: Normal range of motion and neck supple.   Skin:     General: Skin is warm and dry.      Capillary Refill: Capillary refill takes less than 2 seconds.   Neurological:      General: No focal deficit present.      Mental Status: He is alert and oriented to person, place, and time.   Psychiatric:         Mood and Affect: Mood normal.         Behavior: Behavior normal. Behavior is cooperative.         Assessment/Plan   Diagnoses and all orders for this visit:  Jaw pain  -     naproxen (Naprosyn) 500 mg tablet; Take 1 tablet (500 mg) by mouth 2 times a day as needed (pain) for up to 10 days.  -     clindamycin (Cleocin HCL) 300 mg capsule; Take 1 capsule (300 mg) by mouth 3 times a day for 10 days.    Suspect possible sialadenitis  Will switch antibiotics; Can start new antibiotic with next dose  Naproxen to help with pain and swelling  Encouraged warm compress with gentle message to the area  Can try sour candy/lemon drops to stimulate secretions  Follow up with PCP if not improving over the next several days  ER for any SOB, difficulty breathing/swallowing, uncontrolled fevers or worsening of symptoms

## 2025-06-27 ENCOUNTER — TELEPHONE (OUTPATIENT)
Dept: PRIMARY CARE | Facility: CLINIC | Age: 50
End: 2025-06-27
Payer: COMMERCIAL

## 2025-06-27 ENCOUNTER — DOCUMENTATION (OUTPATIENT)
Dept: PRIMARY CARE | Facility: CLINIC | Age: 50
End: 2025-06-27
Payer: COMMERCIAL

## 2025-06-27 DIAGNOSIS — R22.0 LEFT FACIAL SWELLING: Primary | ICD-10-CM

## 2025-06-27 DIAGNOSIS — R22.0 LEFT FACIAL SWELLING: ICD-10-CM

## 2025-06-27 DIAGNOSIS — R68.84 JAW PAIN: Primary | ICD-10-CM

## 2025-06-27 DIAGNOSIS — R68.84 JAW PAIN: ICD-10-CM

## 2025-06-27 RX ORDER — METHYLPREDNISOLONE 4 MG/1
TABLET ORAL
Qty: 21 TABLET | Refills: 0 | Status: SHIPPED | OUTPATIENT
Start: 2025-06-27 | End: 2025-07-03

## 2025-06-27 RX ORDER — METHYLPREDNISOLONE 4 MG/1
TABLET ORAL
Qty: 21 TABLET | Refills: 0 | Status: SHIPPED
Start: 2025-06-27 | End: 2025-06-27 | Stop reason: ALTCHOICE

## 2025-06-27 RX ORDER — CLINDAMYCIN HYDROCHLORIDE 300 MG/1
300 CAPSULE ORAL 3 TIMES DAILY
Qty: 21 CAPSULE | Refills: 0 | Status: SHIPPED | OUTPATIENT
Start: 2025-06-27 | End: 2025-07-04

## 2025-06-27 NOTE — PROGRESS NOTES
Pt called stating he is not any better. He has worsening pain and swelling. Has one more day left of the zpack. Still not eating much due to pain. Unsure if he has temperature sensitivity; has not tried    Will start on steroids for pain and swelling as well as order facial CT. Will follow up on results as available. Recommended ER for any worsening of symptoms

## 2025-06-27 NOTE — TELEPHONE ENCOUNTER
Spoke to pt. Will start on Clindamycin and steroid. Order for xray placed. Will follow up on results as needed.    If needed, CT scheduled for July 11 @ 9:15 in [place].    ER for any worsening of symptoms

## 2025-06-27 NOTE — PROGRESS NOTES
Worsening swelling/pain  Neck glands swollen  Hard to swollow, but no sore throat  L sided mostly    No fevers    Has one day left on zpack

## 2025-07-11 ENCOUNTER — APPOINTMENT (OUTPATIENT)
Dept: RADIOLOGY | Facility: HOSPITAL | Age: 50
End: 2025-07-11
Payer: COMMERCIAL

## 2025-08-21 ENCOUNTER — APPOINTMENT (OUTPATIENT)
Dept: PRIMARY CARE | Facility: CLINIC | Age: 50
End: 2025-08-21
Payer: COMMERCIAL

## 2025-08-21 VITALS
TEMPERATURE: 98 F | HEIGHT: 75 IN | WEIGHT: 266 LBS | BODY MASS INDEX: 33.07 KG/M2 | OXYGEN SATURATION: 100 % | DIASTOLIC BLOOD PRESSURE: 72 MMHG | RESPIRATION RATE: 16 BRPM | SYSTOLIC BLOOD PRESSURE: 126 MMHG | HEART RATE: 80 BPM

## 2025-08-21 DIAGNOSIS — F41.9 ANXIETY: ICD-10-CM

## 2025-08-21 DIAGNOSIS — F32.1 MAJOR DEPRESSIVE DISORDER, SINGLE EPISODE, MODERATE (MULTI): Primary | ICD-10-CM

## 2025-08-21 PROCEDURE — 1036F TOBACCO NON-USER: CPT | Performed by: INTERNAL MEDICINE

## 2025-08-21 PROCEDURE — 3008F BODY MASS INDEX DOCD: CPT | Performed by: INTERNAL MEDICINE

## 2025-08-21 PROCEDURE — 99214 OFFICE O/P EST MOD 30 MIN: CPT | Performed by: INTERNAL MEDICINE

## 2025-08-21 RX ORDER — SERTRALINE HYDROCHLORIDE 50 MG/1
50 TABLET, FILM COATED ORAL DAILY
Qty: 30 TABLET | Refills: 11 | Status: SHIPPED | OUTPATIENT
Start: 2025-08-21 | End: 2026-08-21

## 2025-08-21 RX ORDER — ALPRAZOLAM 0.5 MG/1
0.5 TABLET ORAL 3 TIMES DAILY PRN
Qty: 18 TABLET | Refills: 0 | Status: SHIPPED | OUTPATIENT
Start: 2025-08-21 | End: 2025-08-27

## 2025-08-21 ASSESSMENT — PATIENT HEALTH QUESTIONNAIRE - PHQ9
SUM OF ALL RESPONSES TO PHQ9 QUESTIONS 1 AND 2: 0
1. LITTLE INTEREST OR PLEASURE IN DOING THINGS: NOT AT ALL
2. FEELING DOWN, DEPRESSED OR HOPELESS: NOT AT ALL

## 2025-08-21 ASSESSMENT — ENCOUNTER SYMPTOMS
FATIGUE: 0
COUGH: 0
FEVER: 0
SHORTNESS OF BREATH: 0

## 2025-08-22 ENCOUNTER — TELEPHONE (OUTPATIENT)
Dept: PRIMARY CARE | Facility: CLINIC | Age: 50
End: 2025-08-22
Payer: COMMERCIAL

## 2025-09-09 ENCOUNTER — APPOINTMENT (OUTPATIENT)
Dept: PRIMARY CARE | Facility: CLINIC | Age: 50
End: 2025-09-09
Payer: COMMERCIAL

## 2025-09-24 ENCOUNTER — APPOINTMENT (OUTPATIENT)
Dept: PRIMARY CARE | Facility: CLINIC | Age: 50
End: 2025-09-24
Payer: COMMERCIAL

## 2025-12-09 ENCOUNTER — APPOINTMENT (OUTPATIENT)
Dept: ENDOCRINOLOGY | Facility: CLINIC | Age: 50
End: 2025-12-09
Payer: COMMERCIAL

## 2026-05-07 ENCOUNTER — APPOINTMENT (OUTPATIENT)
Dept: PRIMARY CARE | Facility: CLINIC | Age: 51
End: 2026-05-07
Payer: COMMERCIAL